# Patient Record
Sex: MALE | Race: BLACK OR AFRICAN AMERICAN | NOT HISPANIC OR LATINO | Employment: OTHER | URBAN - METROPOLITAN AREA
[De-identification: names, ages, dates, MRNs, and addresses within clinical notes are randomized per-mention and may not be internally consistent; named-entity substitution may affect disease eponyms.]

---

## 2023-08-24 DIAGNOSIS — D47.2 MONOCLONAL GAMMOPATHY PRESENT ON SERUM PROTEIN ELECTROPHORESIS: Primary | ICD-10-CM

## 2023-12-12 ENCOUNTER — HOSPITAL ENCOUNTER (OUTPATIENT)
Dept: RADIOLOGY | Facility: HOSPITAL | Age: 63
Discharge: HOME | End: 2023-12-12
Attending: INTERNAL MEDICINE
Payer: MEDICAID

## 2023-12-12 VITALS
OXYGEN SATURATION: 97 % | RESPIRATION RATE: 11 BRPM | WEIGHT: 181.69 LBS | BODY MASS INDEX: 27.54 KG/M2 | SYSTOLIC BLOOD PRESSURE: 138 MMHG | HEART RATE: 94 BPM | TEMPERATURE: 98 F | HEIGHT: 68 IN | DIASTOLIC BLOOD PRESSURE: 75 MMHG

## 2023-12-12 DIAGNOSIS — R80.9 NEPHROTIC RANGE PROTEINURIA: ICD-10-CM

## 2023-12-12 LAB
ALBUMIN SERPL-MCNC: 2.7 G/DL (ref 3.4–4.8)
ALBUMIN/GLOB SERPL: 0.7 RATIO (ref 1.1–2)
ALP SERPL-CCNC: 101 UNIT/L (ref 40–150)
ALT SERPL-CCNC: 12 UNIT/L (ref 0–55)
AST SERPL-CCNC: 10 UNIT/L (ref 5–34)
BASOPHILS # BLD AUTO: 0.11 X10(3)/MCL
BASOPHILS NFR BLD AUTO: 0.9 %
BILIRUB SERPL-MCNC: 0.2 MG/DL
BUN SERPL-MCNC: 35.9 MG/DL (ref 8.4–25.7)
CALCIUM SERPL-MCNC: 9 MG/DL (ref 8.8–10)
CHLORIDE SERPL-SCNC: 109 MMOL/L (ref 98–107)
CO2 SERPL-SCNC: 20 MMOL/L (ref 23–31)
CREAT SERPL-MCNC: 3.65 MG/DL (ref 0.73–1.18)
EOSINOPHIL # BLD AUTO: 0.15 X10(3)/MCL (ref 0–0.9)
EOSINOPHIL NFR BLD AUTO: 1.2 %
ERYTHROCYTE [DISTWIDTH] IN BLOOD BY AUTOMATED COUNT: 13.9 % (ref 11.5–17)
GFR SERPLBLD CREATININE-BSD FMLA CKD-EPI: 18 MLS/MIN/1.73/M2
GLOBULIN SER-MCNC: 3.9 GM/DL (ref 2.4–3.5)
GLUCOSE SERPL-MCNC: 177 MG/DL (ref 82–115)
HCT VFR BLD AUTO: 29.9 % (ref 42–52)
HGB BLD-MCNC: 9.2 G/DL (ref 14–18)
IMM GRANULOCYTES # BLD AUTO: 0.06 X10(3)/MCL (ref 0–0.04)
IMM GRANULOCYTES NFR BLD AUTO: 0.5 %
INR PPP: 0.9
LYMPHOCYTES # BLD AUTO: 1.75 X10(3)/MCL (ref 0.6–4.6)
LYMPHOCYTES NFR BLD AUTO: 13.7 %
MCH RBC QN AUTO: 28.1 PG (ref 27–31)
MCHC RBC AUTO-ENTMCNC: 30.8 G/DL (ref 33–36)
MCV RBC AUTO: 91.4 FL (ref 80–94)
MONOCYTES # BLD AUTO: 0.79 X10(3)/MCL (ref 0.1–1.3)
MONOCYTES NFR BLD AUTO: 6.2 %
NEUTROPHILS # BLD AUTO: 9.96 X10(3)/MCL (ref 2.1–9.2)
NEUTROPHILS NFR BLD AUTO: 77.5 %
NRBC BLD AUTO-RTO: 0 %
PLATELET # BLD AUTO: 400 X10(3)/MCL (ref 130–400)
PMV BLD AUTO: 10.5 FL (ref 7.4–10.4)
POCT GLUCOSE: 173 MG/DL (ref 70–110)
POTASSIUM SERPL-SCNC: 5.5 MMOL/L (ref 3.5–5.1)
PROT SERPL-MCNC: 6.6 GM/DL (ref 5.8–7.6)
PROTHROMBIN TIME: 12.2 SECONDS (ref 12.5–14.5)
RBC # BLD AUTO: 3.27 X10(6)/MCL (ref 4.7–6.1)
SODIUM SERPL-SCNC: 137 MMOL/L (ref 136–145)
WBC # SPEC AUTO: 12.82 X10(3)/MCL (ref 4.5–11.5)

## 2023-12-12 PROCEDURE — 25000003 PHARM REV CODE 250: Performed by: RADIOLOGY

## 2023-12-12 PROCEDURE — 85025 COMPLETE CBC W/AUTO DIFF WBC: CPT | Performed by: RADIOLOGY

## 2023-12-12 PROCEDURE — 85610 PROTHROMBIN TIME: CPT | Performed by: RADIOLOGY

## 2023-12-12 PROCEDURE — 99152 MOD SED SAME PHYS/QHP 5/>YRS: CPT

## 2023-12-12 PROCEDURE — 63600175 PHARM REV CODE 636 W HCPCS: Performed by: RADIOLOGY

## 2023-12-12 PROCEDURE — 77012 CT SCAN FOR NEEDLE BIOPSY: CPT | Mod: TC

## 2023-12-12 PROCEDURE — 80053 COMPREHEN METABOLIC PANEL: CPT | Performed by: RADIOLOGY

## 2023-12-12 RX ORDER — DAPAGLIFLOZIN 10 MG/1
10 TABLET, FILM COATED ORAL DAILY
COMMUNITY

## 2023-12-12 RX ORDER — LEVOTHYROXINE SODIUM 25 UG/1
25 TABLET ORAL
COMMUNITY

## 2023-12-12 RX ORDER — MIDAZOLAM HYDROCHLORIDE 1 MG/ML
INJECTION INTRAMUSCULAR; INTRAVENOUS
Status: DISPENSED
Start: 2023-12-12 | End: 2023-12-12

## 2023-12-12 RX ORDER — FENTANYL CITRATE 50 UG/ML
INJECTION, SOLUTION INTRAMUSCULAR; INTRAVENOUS
Status: COMPLETED | OUTPATIENT
Start: 2023-12-12 | End: 2023-12-12

## 2023-12-12 RX ORDER — FENTANYL CITRATE 50 UG/ML
INJECTION, SOLUTION INTRAMUSCULAR; INTRAVENOUS
Status: DISPENSED
Start: 2023-12-12 | End: 2023-12-12

## 2023-12-12 RX ORDER — GLIMEPIRIDE 1 MG/1
1 TABLET ORAL
COMMUNITY

## 2023-12-12 RX ORDER — ERGOCALCIFEROL 1.25 MG/1
50000 CAPSULE ORAL
COMMUNITY

## 2023-12-12 RX ORDER — FUROSEMIDE 20 MG/1
20 TABLET ORAL DAILY
COMMUNITY

## 2023-12-12 RX ORDER — MIDAZOLAM HYDROCHLORIDE 1 MG/ML
INJECTION INTRAMUSCULAR; INTRAVENOUS
Status: COMPLETED | OUTPATIENT
Start: 2023-12-12 | End: 2023-12-12

## 2023-12-12 RX ORDER — AMLODIPINE BESYLATE 10 MG/1
10 TABLET ORAL DAILY
COMMUNITY

## 2023-12-12 RX ORDER — LIDOCAINE HYDROCHLORIDE 20 MG/ML
INJECTION, SOLUTION INFILTRATION; PERINEURAL
Status: COMPLETED | OUTPATIENT
Start: 2023-12-12 | End: 2023-12-12

## 2023-12-12 RX ORDER — DULAGLUTIDE 3 MG/.5ML
3 INJECTION, SOLUTION SUBCUTANEOUS
COMMUNITY

## 2023-12-12 RX ORDER — LIDOCAINE HYDROCHLORIDE 10 MG/ML
INJECTION INFILTRATION; PERINEURAL
Status: DISPENSED
Start: 2023-12-12 | End: 2023-12-12

## 2023-12-12 RX ORDER — OMEPRAZOLE 40 MG/1
40 CAPSULE, DELAYED RELEASE ORAL DAILY
COMMUNITY

## 2023-12-12 RX ADMIN — MIDAZOLAM 1 MG: 1 INJECTION INTRAMUSCULAR; INTRAVENOUS at 10:12

## 2023-12-12 RX ADMIN — FENTANYL CITRATE 25 MCG: 50 INJECTION, SOLUTION INTRAMUSCULAR; INTRAVENOUS at 10:12

## 2023-12-12 RX ADMIN — LIDOCAINE HYDROCHLORIDE 5 ML: 20 INJECTION, SOLUTION INFILTRATION; PERINEURAL at 10:12

## 2023-12-12 NOTE — H&P
Radiology History & Physical      SUBJECTIVE:     Chief Complaint: Renal Dysfunction    History of Present Illness:  Qasim Harry is a 63 y.o. male who presents for Biopsy  Past Medical History:   Diagnosis Date    Anemia, unspecified     CKD (chronic kidney disease)     Diabetes mellitus     Hypertension     Isolated non-nephrotic proteinuria     Systemic lupus erythematosus, organ or system involvement unspecified      History reviewed. No pertinent surgical history.    Home Meds:   Prior to Admission medications    Medication Sig Start Date End Date Taking? Authorizing Provider   amLODIPine (NORVASC) 10 MG tablet Take 10 mg by mouth once daily.   Yes Provider, Historical   dapagliflozin propanediol (FARXIGA) 10 mg tablet Take 10 mg by mouth once daily.   Yes Provider, Historical   dulaglutide (TRULICITY) 3 mg/0.5 mL pen injector Inject 3 mg into the skin every 7 days.   Yes Provider, Historical   ergocalciferol (VITAMIN D2) 50,000 unit Cap Take 50,000 Units by mouth every 7 days.   Yes Provider, Historical   furosemide (LASIX) 20 MG tablet Take 20 mg by mouth once daily.   Yes Provider, Historical   levothyroxine (SYNTHROID) 25 MCG tablet Take 25 mcg by mouth before breakfast.   Yes Provider, Historical   omeprazole (PRILOSEC) 40 MG capsule Take 40 mg by mouth once daily.   Yes Provider, Historical   glimepiride (AMARYL) 1 MG tablet Take 1 mg by mouth before breakfast.    Provider, Historical   SITagliptin phosphate (JANUVIA) 100 MG Tab Take by mouth once daily.    Provider, Historical     Anticoagulants/Antiplatelets: no anticoagulation    Allergies: Review of patient's allergies indicates:  No Known Allergies  Sedation History:  no adverse reactions    Review of Systems:   Hematological: no known coagulopathies  Respiratory: no shortness of breath  Cardiovascular: no chest pain  Gastrointestinal: no abdominal pain  Genito-Urinary: no dysuria  Musculoskeletal: negative  Neurological: no TIA or stroke symptoms          OBJECTIVE:     Vital Signs (Most Recent)  Temp: 98.1 °F (36.7 °C) (12/12/23 0839)  Pulse: 97 (12/12/23 1025)  Resp: 11 (12/12/23 1025)  BP: 137/80 (12/12/23 1025)  SpO2: 99 % (12/12/23 1025)    Physical Exam:  ASA: 2    General: no acute distress  Mental Status: alert and oriented to person, place and time  HEENT: normocephalic, atraumatic  Chest: unlabored breathing  Heart: regular heart rate  Abdomen: nondistended  Extremity: moves all extremities    Laboratory  Lab Results   Component Value Date    INR 0.9 12/12/2023       Lab Results   Component Value Date    WBC 12.82 (H) 12/12/2023    HGB 9.2 (L) 12/12/2023    HCT 29.9 (L) 12/12/2023    MCV 91.4 12/12/2023     12/12/2023      Lab Results   Component Value Date     12/12/2023    K 5.5 (H) 12/12/2023    CO2 20 (L) 12/12/2023    BUN 35.9 (H) 12/12/2023    CREATININE 3.65 (H) 12/12/2023    CALCIUM 9.0 12/12/2023    ALT 12 12/12/2023    AST 10 12/12/2023    ALBUMIN 2.7 (L) 12/12/2023    BILITOT 0.2 12/12/2023       ASSESSMENT/PLAN:     Sedation Plan: Moderate  Patient will undergo Renal Biopsy.    Ronen Lombardi MD

## 2023-12-15 ENCOUNTER — OFFICE VISIT (OUTPATIENT)
Dept: HEMATOLOGY/ONCOLOGY | Facility: CLINIC | Age: 63
End: 2023-12-15
Attending: INTERNAL MEDICINE
Payer: MEDICAID

## 2023-12-15 VITALS
BODY MASS INDEX: 27.74 KG/M2 | TEMPERATURE: 98 F | SYSTOLIC BLOOD PRESSURE: 144 MMHG | OXYGEN SATURATION: 100 % | WEIGHT: 183 LBS | HEART RATE: 98 BPM | HEIGHT: 68 IN | DIASTOLIC BLOOD PRESSURE: 82 MMHG | RESPIRATION RATE: 18 BRPM

## 2023-12-15 DIAGNOSIS — M32.9 SLE (SYSTEMIC LUPUS ERYTHEMATOSUS RELATED SYNDROME): ICD-10-CM

## 2023-12-15 DIAGNOSIS — D47.2 MONOCLONAL GAMMOPATHY PRESENT ON SERUM PROTEIN ELECTROPHORESIS: ICD-10-CM

## 2023-12-15 DIAGNOSIS — R80.9 NEPHROTIC RANGE PROTEINURIA: ICD-10-CM

## 2023-12-15 DIAGNOSIS — D47.2 MONOCLONAL GAMMOPATHY: Primary | ICD-10-CM

## 2023-12-15 DIAGNOSIS — N18.9 ANEMIA IN CHRONIC KIDNEY DISEASE, UNSPECIFIED CKD STAGE: ICD-10-CM

## 2023-12-15 DIAGNOSIS — D63.1 ANEMIA IN CHRONIC KIDNEY DISEASE, UNSPECIFIED CKD STAGE: ICD-10-CM

## 2023-12-15 DIAGNOSIS — E13.21 DIABETIC NEPHROPATHY ASSOCIATED WITH OTHER SPECIFIED DIABETES MELLITUS: ICD-10-CM

## 2023-12-15 PROBLEM — E11.21 DIABETIC NEPHROPATHY: Status: ACTIVE | Noted: 2023-12-15

## 2023-12-15 LAB
ALBUMIN SERPL-MCNC: 2.6 G/DL (ref 3.4–4.8)
ALBUMIN/GLOB SERPL: 0.6 RATIO (ref 1.1–2)
ALP SERPL-CCNC: 101 UNIT/L (ref 40–150)
ALT SERPL-CCNC: 10 UNIT/L (ref 0–55)
AST SERPL-CCNC: 9 UNIT/L (ref 5–34)
BASOPHILS # BLD AUTO: 0.07 X10(3)/MCL
BASOPHILS NFR BLD AUTO: 0.7 %
BILIRUB SERPL-MCNC: 0.2 MG/DL
BUN SERPL-MCNC: 38.3 MG/DL (ref 8.4–25.7)
CALCIUM SERPL-MCNC: 9.3 MG/DL (ref 8.8–10)
CHLORIDE SERPL-SCNC: 114 MMOL/L (ref 98–107)
CO2 SERPL-SCNC: 17 MMOL/L (ref 23–31)
CREAT SERPL-MCNC: 3 MG/DL (ref 0.73–1.18)
EOSINOPHIL # BLD AUTO: 0.1 X10(3)/MCL (ref 0–0.9)
EOSINOPHIL NFR BLD AUTO: 1 %
ERYTHROCYTE [DISTWIDTH] IN BLOOD BY AUTOMATED COUNT: 13.6 % (ref 11.5–17)
FERRITIN SERPL-MCNC: 72.11 NG/ML (ref 21.81–274.66)
FOLATE SERPL-MCNC: 6.4 NG/ML (ref 7–31.4)
GFR SERPLBLD CREATININE-BSD FMLA CKD-EPI: 23 MLS/MIN/1.73/M2
GLOBULIN SER-MCNC: 4.6 GM/DL (ref 2.4–3.5)
GLUCOSE SERPL-MCNC: 223 MG/DL (ref 82–115)
HAPTOGLOB SERPL-MCNC: 429 MG/DL (ref 40–368)
HCT VFR BLD AUTO: 31.7 % (ref 42–52)
HGB BLD-MCNC: 9.6 G/DL (ref 14–18)
IGA SERPL-MCNC: 429 MG/DL (ref 101–645)
IGG SERPL-MCNC: 913 MG/DL (ref 540–1822)
IGM SERPL-MCNC: 112 MG/DL (ref 22–240)
IMM GRANULOCYTES # BLD AUTO: 0.05 X10(3)/MCL (ref 0–0.04)
IMM GRANULOCYTES NFR BLD AUTO: 0.5 %
IRON SATN MFR SERPL: 34 % (ref 20–50)
IRON SERPL-MCNC: 86 UG/DL (ref 65–175)
LDH SERPL-CCNC: 203 U/L (ref 125–220)
LYMPHOCYTES # BLD AUTO: 1.5 X10(3)/MCL (ref 0.6–4.6)
LYMPHOCYTES NFR BLD AUTO: 14.5 %
MCH RBC QN AUTO: 28.1 PG (ref 27–31)
MCHC RBC AUTO-ENTMCNC: 30.3 G/DL (ref 33–36)
MCV RBC AUTO: 92.7 FL (ref 80–94)
MONOCYTES # BLD AUTO: 0.54 X10(3)/MCL (ref 0.1–1.3)
MONOCYTES NFR BLD AUTO: 5.2 %
NEUTROPHILS # BLD AUTO: 8.05 X10(3)/MCL (ref 2.1–9.2)
NEUTROPHILS NFR BLD AUTO: 78.1 %
NRBC BLD AUTO-RTO: 0 %
PLATELET # BLD AUTO: 371 X10(3)/MCL (ref 130–400)
PMV BLD AUTO: 10.3 FL (ref 7.4–10.4)
POTASSIUM SERPL-SCNC: 5.5 MMOL/L (ref 3.5–5.1)
PROT SERPL-MCNC: 7.2 GM/DL (ref 5.8–7.6)
RBC # BLD AUTO: 3.42 X10(6)/MCL (ref 4.7–6.1)
RET# (OHS): 0.05 X10E6/UL (ref 0.03–0.1)
RETICULOCYTE COUNT AUTOMATED (OLG): 1.56 % (ref 1.1–2.1)
SODIUM SERPL-SCNC: 139 MMOL/L (ref 136–145)
TIBC SERPL-MCNC: 164 UG/DL (ref 69–240)
TIBC SERPL-MCNC: 250 UG/DL (ref 250–450)
TRANSFERRIN SERPL-MCNC: 197 MG/DL (ref 163–344)
VIT B12 SERPL-MCNC: 372 PG/ML (ref 213–816)
WBC # SPEC AUTO: 10.31 X10(3)/MCL (ref 4.5–11.5)

## 2023-12-15 PROCEDURE — 1160F RVW MEDS BY RX/DR IN RCRD: CPT | Mod: CPTII,,, | Performed by: INTERNAL MEDICINE

## 2023-12-15 PROCEDURE — 3077F SYST BP >= 140 MM HG: CPT | Mod: CPTII,,, | Performed by: INTERNAL MEDICINE

## 2023-12-15 PROCEDURE — 85025 COMPLETE CBC W/AUTO DIFF WBC: CPT | Performed by: INTERNAL MEDICINE

## 2023-12-15 PROCEDURE — 82746 ASSAY OF FOLIC ACID SERUM: CPT | Performed by: INTERNAL MEDICINE

## 2023-12-15 PROCEDURE — 83521 IG LIGHT CHAINS FREE EACH: CPT | Performed by: INTERNAL MEDICINE

## 2023-12-15 PROCEDURE — 3077F PR MOST RECENT SYSTOLIC BLOOD PRESSURE >= 140 MM HG: ICD-10-PCS | Mod: CPTII,,, | Performed by: INTERNAL MEDICINE

## 2023-12-15 PROCEDURE — 83540 ASSAY OF IRON: CPT | Performed by: INTERNAL MEDICINE

## 2023-12-15 PROCEDURE — 1159F PR MEDICATION LIST DOCUMENTED IN MEDICAL RECORD: ICD-10-PCS | Mod: CPTII,,, | Performed by: INTERNAL MEDICINE

## 2023-12-15 PROCEDURE — 99204 PR OFFICE/OUTPT VISIT, NEW, LEVL IV, 45-59 MIN: ICD-10-PCS | Mod: S$PBB,,, | Performed by: INTERNAL MEDICINE

## 2023-12-15 PROCEDURE — 3079F PR MOST RECENT DIASTOLIC BLOOD PRESSURE 80-89 MM HG: ICD-10-PCS | Mod: CPTII,,, | Performed by: INTERNAL MEDICINE

## 2023-12-15 PROCEDURE — 83010 ASSAY OF HAPTOGLOBIN QUANT: CPT | Performed by: INTERNAL MEDICINE

## 2023-12-15 PROCEDURE — 99214 OFFICE O/P EST MOD 30 MIN: CPT | Mod: PBBFAC | Performed by: INTERNAL MEDICINE

## 2023-12-15 PROCEDURE — 80053 COMPREHEN METABOLIC PANEL: CPT | Performed by: INTERNAL MEDICINE

## 2023-12-15 PROCEDURE — 1159F MED LIST DOCD IN RCRD: CPT | Mod: CPTII,,, | Performed by: INTERNAL MEDICINE

## 2023-12-15 PROCEDURE — 85045 AUTOMATED RETICULOCYTE COUNT: CPT | Performed by: INTERNAL MEDICINE

## 2023-12-15 PROCEDURE — 83615 LACTATE (LD) (LDH) ENZYME: CPT | Performed by: INTERNAL MEDICINE

## 2023-12-15 PROCEDURE — 86334 IMMUNOFIX E-PHORESIS SERUM: CPT | Performed by: INTERNAL MEDICINE

## 2023-12-15 PROCEDURE — 36415 COLL VENOUS BLD VENIPUNCTURE: CPT | Performed by: INTERNAL MEDICINE

## 2023-12-15 PROCEDURE — 1160F PR REVIEW ALL MEDS BY PRESCRIBER/CLIN PHARMACIST DOCUMENTED: ICD-10-PCS | Mod: CPTII,,, | Performed by: INTERNAL MEDICINE

## 2023-12-15 PROCEDURE — 3008F PR BODY MASS INDEX (BMI) DOCUMENTED: ICD-10-PCS | Mod: CPTII,,, | Performed by: INTERNAL MEDICINE

## 2023-12-15 PROCEDURE — 3008F BODY MASS INDEX DOCD: CPT | Mod: CPTII,,, | Performed by: INTERNAL MEDICINE

## 2023-12-15 PROCEDURE — 3079F DIAST BP 80-89 MM HG: CPT | Mod: CPTII,,, | Performed by: INTERNAL MEDICINE

## 2023-12-15 PROCEDURE — 82728 ASSAY OF FERRITIN: CPT | Performed by: INTERNAL MEDICINE

## 2023-12-15 PROCEDURE — 84165 PROTEIN E-PHORESIS SERUM: CPT | Performed by: INTERNAL MEDICINE

## 2023-12-15 PROCEDURE — 99204 OFFICE O/P NEW MOD 45 MIN: CPT | Mod: S$PBB,,, | Performed by: INTERNAL MEDICINE

## 2023-12-15 PROCEDURE — 82607 VITAMIN B-12: CPT | Performed by: INTERNAL MEDICINE

## 2023-12-15 PROCEDURE — 82784 ASSAY IGA/IGD/IGG/IGM EACH: CPT | Performed by: INTERNAL MEDICINE

## 2023-12-15 NOTE — PROGRESS NOTES
History:  Past Medical History:   Diagnosis Date    Anemia, unspecified     CKD (chronic kidney disease)     Diabetes mellitus     Hypertension     Isolated non-nephrotic proteinuria     Systemic lupus erythematosus, organ or system involvement unspecified    Past medical history:  As above.  CKD, stage IIIB.  Diabetic nephropathy.  Hypertension.  Nephrotic range proteinuria.  Anemia of CKD.  SLE (on 12/15/2023, he tells me that he has had lupus for last 10 years; has been off treatment for last 7-8 years; currently, appears to be inactive).  Microscopic hematuria.    Social history:  Lives in [place], [place].  Single.  Has 2 children.  Does not work.  Smoked 1 to 1-1/2 packs of cigarettes daily for 40 years; quit 10 years ago when he was diagnosed with SLE.  Social alcohol.  No illicit drugs.    Family history:  Negative for cancers or blood dyscrasias.      Health maintenance:  PCP in [place], [place].  Says that he has had 3 colonoscopies done in [place], [place]; last colonoscopy was 5-6 years ago which showed a few colon polyps.   No past surgical history on file.   Social History     Socioeconomic History    Marital status: Single   Tobacco Use    Smoking status: Former     Current packs/day: 0.00     Types: Cigarettes     Quit date: 12/12/2008     Years since quitting: 15.0    Smokeless tobacco: Never   Substance and Sexual Activity    Alcohol use: Yes     Alcohol/week: 1.0 - 2.0 standard drink of alcohol     Types: 1 - 2 Cans of beer per week    Drug use: Never      No family history on file.     Reason for Follow-up:  Monoclonal gammopathy   Anemia of CKD  Diabetic nephropathy, CKD stage IIIA/IIIb/4, nephrotic range protein urea, SLE    History of Present Illness:   No chief complaint on file.        Oncologic/Hematologic History:  Oncology History    No history exists.   63-year-old gentleman, referred by KATHIE Tyler, for evaluation of monoclonal gammopathy.      07/11/2023: Nephrology  office note:  CKD, stage 3b (GFR 30); NIDDM with diabetic CKD; hypertension; nephrotic range proteinuria; anemia of CKD; SLE   Nephrotic range proteinuria,18.2 g daily  History of allergy to ACE inhibitor  Kidney biopsy had to be canceled due to uncontrolled blood pressure  SPEP showed monoclonal protein  Anemia stable      Labs reviewed:  -02/27/2023:  SPEP:  Possible monoclonal protein (M protein) present; recommend serum immunofixation  -05/02/2023:  BUN 23, normal; creatinine 2.40, elevated; estimated GFR 29.4 mL/minute; hemoglobin 9.9; MCV 94.7, normal  -07/05/2023:  Albumin 3.2, low; creatinine 2.60, elevated; total protein 6.0, low; BUN 28.6, elevated; magnesium, uric acid normal; 25 hydroxy vitamin-D level 12 ng/mL, low  -07/05/2023:  CBC:  Hemoglobin 9.5; MCV normal  -12/12/2023:  Kidney biopsy, right kidney by IR  -12/12/2023:  Hemoglobin 9.2, MCV normal; BUN 35.9; creatinine 3.65; albumin 2.7    12/15/2023:   Presents for initial medical oncology/hematology consultation, accompanied by a male , probably his son.  In no acute discomfort.  Overall, feels well and healthy.  ECOG 0.  Some nausea and diarrhea without any blood.  Good appetite.  Good energy.  No unusual headaches, focal neurological symptoms, chest pain, cough, dyspnea, recurrent fevers or chills, drenching night sweats, anorexia, unintentional weight loss, abdominal pain, nausea, vomiting, bone pains, etc..  He has underlying CKD for which she underwent kidney biopsy recently.  Says that he was diagnosed with SLE 10 years ago; has been off treatment for last 7-8 years, probably, currently, in remission; he has a few scars in the face as residual.        Interval History:  [No matching plan found]   [No matching plan found]       Medications:  Current Outpatient Medications on File Prior to Visit   Medication Sig Dispense Refill    amLODIPine (NORVASC) 10 MG tablet Take 10 mg by mouth once daily.      dapagliflozin propanediol  (FARXIGA) 10 mg tablet Take 10 mg by mouth once daily.      dulaglutide (TRULICITY) 3 mg/0.5 mL pen injector Inject 3 mg into the skin every 7 days.      ergocalciferol (VITAMIN D2) 50,000 unit Cap Take 50,000 Units by mouth every 7 days.      furosemide (LASIX) 20 MG tablet Take 20 mg by mouth once daily.      glimepiride (AMARYL) 1 MG tablet Take 1 mg by mouth before breakfast.      levothyroxine (SYNTHROID) 25 MCG tablet Take 25 mcg by mouth before breakfast.      omeprazole (PRILOSEC) 40 MG capsule Take 40 mg by mouth once daily.      SITagliptin phosphate (JANUVIA) 100 MG Tab Take by mouth once daily.       No current facility-administered medications on file prior to visit.     Review of Systems:   All systems reviewed and found to be negative except for the symptoms detailed above    Physical Examination:   VITAL SIGNS: There were no vitals filed for this visit.  GENERAL:  In no apparent distress.    HEAD:  No signs of head trauma.  EYES:  Pupils are equal.  Extraocular motions intact.    EARS:  Hearing grossly intact.  MOUTH:  Oropharynx is normal.   NECK:  No adenopathy, no JVD.     CHEST:  Chest with clear breath sounds bilaterally.  No wheezes, rales, rhonchi.    CARDIAC:  Regular rate and rhythm.  S1 and S2, without murmurs, gallops, rubs.  VASCULAR:  No Edema.  Peripheral pulses normal and equal in all extremities.  ABDOMEN:  Soft, without detectable tenderness.  No sign of distention.  No   rebound or guarding, and no masses palpated.   Bowel Sounds normal.  MUSCULOSKELETAL:  Good range of motion of all major joints. Extremities without clubbing, cyanosis or edema.    NEUROLOGIC EXAM:  Alert and oriented x 3.  No focal sensory or strength deficits.   Speech normal.  Follows commands.  PSYCHIATRIC:  Mood normal.    Results for orders placed or performed during the hospital encounter of 12/12/23   CBC Auto Differential    Narrative    The following orders were created for panel order CBC Auto  Differential.  Procedure                               Abnormality         Status                     ---------                               -----------         ------                     CBC with Differential[[id number]]       Abnormal            Final result                 Please view results for these tests on the individual orders.   CBC with Differential   Result Value Ref Range    WBC 12.82 (H) 4.50 - 11.50 x10(3)/mcL    RBC 3.27 (L) 4.70 - 6.10 x10(6)/mcL    Hgb 9.2 (L) 14.0 - 18.0 g/dL    Hct 29.9 (L) 42.0 - 52.0 %    MCV 91.4 80.0 - 94.0 fL    MCH 28.1 27.0 - 31.0 pg    MCHC 30.8 (L) 33.0 - 36.0 g/dL    RDW 13.9 11.5 - 17.0 %    Platelet 400 130 - 400 x10(3)/mcL    MPV 10.5 (H) 7.4 - 10.4 fL    Neut % 77.5 %    Lymph % 13.7 %    Mono % 6.2 %    Eos % 1.2 %    Basophil % 0.9 %    Lymph # 1.75 0.6 - 4.6 x10(3)/mcL    Neut # 9.96 (H) 2.1 - 9.2 x10(3)/mcL    Mono # 0.79 0.1 - 1.3 x10(3)/mcL    Eos # 0.15 0 - 0.9 x10(3)/mcL    Baso # 0.11 <=0.2 x10(3)/mcL    IG# 0.06 (H) 0 - 0.04 x10(3)/mcL    IG% 0.5 %    NRBC% 0.0 %     Results for orders placed or performed during the hospital encounter of 12/12/23   Comprehensive Metabolic Panel   Result Value Ref Range    Sodium Level 137 136 - 145 mmol/L    Potassium Level 5.5 (H) 3.5 - 5.1 mmol/L    Chloride 109 (H) 98 - 107 mmol/L    Carbon Dioxide 20 (L) 23 - 31 mmol/L    Glucose Level 177 (H) 82 - 115 mg/dL    Blood Urea Nitrogen 35.9 (H) 8.4 - 25.7 mg/dL    Creatinine 3.65 (H) 0.73 - 1.18 mg/dL    Calcium Level Total 9.0 8.8 - 10.0 mg/dL    Protein Total 6.6 5.8 - 7.6 gm/dL    Albumin Level 2.7 (L) 3.4 - 4.8 g/dL    Globulin 3.9 (H) 2.4 - 3.5 gm/dL    Albumin/Globulin Ratio 0.7 (L) 1.1 - 2.0 ratio    Bilirubin Total 0.2 <=1.5 mg/dL    Alkaline Phosphatase 101 40 - 150 unit/L    Alanine Aminotransferase 12 0 - 55 unit/L    Aspartate Aminotransferase 10 5 - 34 unit/L    eGFR 18 mls/min/1.73/m2       Assessment:  Problem List Items Addressed This Visit     None    63-year-old gentleman with history of NIDDM, diabetic nephropathy, CKD, stage 3a, 3b, and 4 at various times, worsening, nephrotic range proteinuria, S/P kidney biopsy 12/12/2023) pathology report pending),     Referred for evaluation of questionable monoclonal gammopathy noted on SPEP 02/27/2023    Also, history of anemia of CKD    History of SLE; apparently, diagnosed 10 years ago, approximately around 2013; treated in [place], [place]; has been off treatment for 7-8 years; apparently, in remission at this time; has a few facial scars as stigmata.      Plan:  -await kidney biopsy report   -will order SPEP, JOSEFINA, FLC assay, and 24 hour urine for total protein, UPEP, and urine FLC assay  -check CBC, CMP, retic count, serum iron, TIBC, ferritin, B12 level, folic acid level, LDH, haptoglobin, FIT test       Follow-up in 3 weeks.      Above discussed at length with the patient.  All questions answered.  Rationale of investigations discussed.  Nature of monoclonal gammopathy discussed.  He understands and agrees with this plan.    Follow-up:  No follow-ups on file.

## 2023-12-18 ENCOUNTER — TELEPHONE (OUTPATIENT)
Dept: HEMATOLOGY/ONCOLOGY | Facility: CLINIC | Age: 63
End: 2023-12-18
Payer: MEDICAID

## 2023-12-18 DIAGNOSIS — E53.8 LOW FOLATE: Primary | ICD-10-CM

## 2023-12-18 LAB
ALBUMIN % SPEP (OHS): 34.1
ALBUMIN SERPL-MCNC: 2.2 G/DL (ref 3.4–4.8)
ALBUMIN/GLOB SERPL: 0.5 RATIO (ref 1.1–2)
ALPHA 1 GLOB (OHS): 0.27 GM/DL
ALPHA 1 GLOB% (OHS): 4.21
ALPHA 2 GLOB % (OHS): 24.06
ALPHA 2 GLOB (OHS): 1.56 GM/DL
BETA GLOB (OHS): 1.57 GM/DL
BETA GLOB% (OHS): 24.2
GAMMA GLOBULIN % (OHS): 13.44
GAMMA GLOBULIN (OHS): 0.87 GM/DL
GLOBULIN SER-MCNC: 4.3 GM/DL (ref 2.4–3.5)
KAPPA LC FREE SER-MCNC: 8.89 MG/DL (ref 0.33–1.94)
KAPPA LC FREE/LAMBDA FREE SER: 1.37 {RATIO} (ref 0.26–1.65)
LAMBDA LC FREE SERPL-MCNC: 6.51 MG/DL (ref 0.57–2.63)
M SPIKE % (OHS): ABNORMAL
M SPIKE (OHS): ABNORMAL
PATH REV: NORMAL
PROT SERPL-MCNC: 6.5 GM/DL (ref 5.8–7.6)
PSYCHE PATHOLOGY RESULT: NORMAL

## 2023-12-20 ENCOUNTER — CLINICAL SUPPORT (OUTPATIENT)
Dept: HEMATOLOGY/ONCOLOGY | Facility: CLINIC | Age: 63
End: 2023-12-20
Payer: MEDICAID

## 2023-12-20 DIAGNOSIS — N18.9 ANEMIA IN CHRONIC KIDNEY DISEASE, UNSPECIFIED CKD STAGE: ICD-10-CM

## 2023-12-20 DIAGNOSIS — D47.2 MONOCLONAL GAMMOPATHY: ICD-10-CM

## 2023-12-20 DIAGNOSIS — D47.2 MONOCLONAL GAMMOPATHY PRESENT ON SERUM PROTEIN ELECTROPHORESIS: ICD-10-CM

## 2023-12-20 DIAGNOSIS — D63.1 ANEMIA IN CHRONIC KIDNEY DISEASE, UNSPECIFIED CKD STAGE: ICD-10-CM

## 2023-12-20 PROCEDURE — 84166 PROTEIN E-PHORESIS/URINE/CSF: CPT

## 2023-12-20 RX ORDER — FOLIC ACID 1 MG/1
1 TABLET ORAL DAILY
Qty: 30 TABLET | Refills: 0 | Status: SHIPPED | OUTPATIENT
Start: 2023-12-20

## 2023-12-22 LAB
ALBUMIN 24H UR ELPH-MRATE: 7835.1 MG/24 H
ALBUMIN/GLOB 24H UR ELPH: 1.81 {RATIO}
ALPHA1 GLOB 24H UR ELPH-MRATE: 723.2 MG/24 H
ALPHA2 GLOB 24H UR ELPH-MRATE: 964.3 MG/24 H
B-GLOBULIN 24H UR ELPH-MRATE: 1567 MG/24 H
COLLECT DURATION TIME UR: 24 H
GAMMA GLOB 24H UR ELPH-MRATE: 1084.9 MG/24 H
PROT 24H UR-MRATE: ABNORMAL MG/24 H
PROT PATTERN 24H UR ELPH-IMP: ABNORMAL
SPECIMEN VOL 24H UR: 2100 ML

## 2024-01-20 PROBLEM — E53.8 FOLIC ACID DEFICIENCY: Status: ACTIVE | Noted: 2024-01-20

## 2024-01-20 NOTE — PROGRESS NOTES
History:  Past Medical History:   Diagnosis Date    Anemia, unspecified     CKD (chronic kidney disease)     Diabetes mellitus     Hypertension     Isolated non-nephrotic proteinuria     Systemic lupus erythematosus, organ or system involvement unspecified    Past medical history:  As above.  CKD, stage IIIB.  Diabetic nephropathy.  Hypertension.  Nephrotic range proteinuria.  Anemia of CKD.  SLE (on 12/15/2023, he tells me that he has had lupus for last 10 years; has been off treatment for last 7-8 years; currently, appears to be inactive).  Microscopic hematuria.    Social history:  Lives in [place], [place].  Single.  Has 2 children.  Does not work.  Smoked 1 to 1-1/2 packs of cigarettes daily for 40 years; quit 10 years ago when he was diagnosed with SLE.  Social alcohol.  No illicit drugs.    Family history:  Negative for cancers or blood dyscrasias.      Health maintenance:  PCP in [place], [place].  Says that he has had 3 colonoscopies done in [place], [place]; last colonoscopy was 5-6 years ago which showed a few colon polyps.   History reviewed. No pertinent surgical history.   Social History     Socioeconomic History    Marital status: Single   Tobacco Use    Smoking status: Former     Current packs/day: 0.00     Types: Cigarettes     Quit date: 12/12/2008     Years since quitting: 15.1    Smokeless tobacco: Never   Substance and Sexual Activity    Alcohol use: Yes     Alcohol/week: 1.0 - 2.0 standard drink of alcohol     Types: 1 - 2 Cans of beer per week    Drug use: Never      History reviewed. No pertinent family history.     Reason for Follow-up:  Monoclonal gammopathy   Anemia of CKD  Diabetic nephropathy, CKD stage IIIA/IIIb/4, nephrotic range protein urea, SLE    History of Present Illness:   Monoclonal gammopathy        Oncologic/Hematologic History:  Oncology History    No history exists.   63-year-old gentleman, referred by KATHIE Tyler, for evaluation of monoclonal  gammopathy.      07/11/2023: Nephrology office note:  CKD, stage 3b (GFR 30); NIDDM with diabetic CKD; hypertension; nephrotic range proteinuria; anemia of CKD; SLE   Nephrotic range proteinuria,18.2 g daily  History of allergy to ACE inhibitor  Kidney biopsy had to be canceled due to uncontrolled blood pressure  SPEP showed monoclonal protein  Anemia stable      Labs reviewed:  -02/27/2023:  SPEP:  Possible monoclonal protein (M protein) present; recommend serum immunofixation  -05/02/2023:  BUN 23, normal; creatinine 2.40, elevated; estimated GFR 29.4 mL/minute; hemoglobin 9.9; MCV 94.7, normal  -07/05/2023:  Albumin 3.2, low; creatinine 2.60, elevated; total protein 6.0, low; BUN 28.6, elevated; magnesium, uric acid normal; 25 hydroxy vitamin-D level 12 ng/mL, low  -07/05/2023:  CBC:  Hemoglobin 9.5; MCV normal  -12/12/2023:  Kidney biopsy, right kidney by IR  -12/12/2023:  Hemoglobin 9.2, MCV normal; BUN 35.9; creatinine 3.65; albumin 2.7    12/15/2023:   Presents for initial medical oncology/hematology consultation, accompanied by a male , probably his son.  In no acute discomfort.  Overall, feels well and healthy.  ECOG 0.  Some nausea and diarrhea without any blood.  Good appetite.  Good energy.  No unusual headaches, focal neurological symptoms, chest pain, cough, dyspnea, recurrent fevers or chills, drenching night sweats, anorexia, unintentional weight loss, abdominal pain, nausea, vomiting, bone pains, etc..  He has underlying CKD for which she underwent kidney biopsy recently.  Says that he was diagnosed with SLE 10 years ago; has been off treatment for last 7-8 years, probably, currently, in remission; he has a few scars in the face as residual.      Interval History:  [No matching plan found]   [No matching plan found]     01/22/2024:   -12/15/2023:  Hemoglobin 9.6, stable; MCV normal; rest of CBC unremarkable; ferritin 72.11; transferrin saturation 34%, normal; B12 372, normal; folate 6.4,  low; haptoglobin 429, elevated; retic count 1.56%; BUN 38.3; creatinine 3.0, stable; albumin 2.2; globulins 4.3-4.6 gm/dL; LDH normal; IgG, IgA, IgM normal; no monoclonal bands on SPEP and JOSEFINA; kappa 8.89, elevated; lambda 6.51, elevated; kappa/lambda ratio 1.37, normal  -12/20/2023:  24 hour urine studies:  Urine volume 2100 cc; 24 hour urine protein 67820 mg (reference Range:  < 229 mg/24 hours); no M spike in urine  -12/12/2023:  Right kidney biopsy:  Limited biopsy with nodular diabetic glomerulosclerosis, class III  Presents for a follow-up visit.  Doing well.  No new complaints.  Great appetite.  We discussed labs and kidney biopsy report.      Medications:  Current Outpatient Medications on File Prior to Visit   Medication Sig Dispense Refill    amLODIPine (NORVASC) 10 MG tablet Take 10 mg by mouth once daily.      dapagliflozin propanediol (FARXIGA) 10 mg tablet Take 10 mg by mouth once daily.      dulaglutide (TRULICITY) 3 mg/0.5 mL pen injector Inject 3 mg into the skin every 7 days.      ergocalciferol (VITAMIN D2) 50,000 unit Cap Take 50,000 Units by mouth every 7 days.      folic acid (FOLVITE) 1 MG tablet Take 1 tablet (1 mg total) by mouth once daily. 30 tablet 0    furosemide (LASIX) 20 MG tablet Take 20 mg by mouth once daily.      glimepiride (AMARYL) 1 MG tablet Take 1 mg by mouth before breakfast.      levothyroxine (SYNTHROID) 25 MCG tablet Take 25 mcg by mouth before breakfast.      omeprazole (PRILOSEC) 40 MG capsule Take 40 mg by mouth once daily.      SITagliptin phosphate (JANUVIA) 100 MG Tab Take by mouth once daily.       No current facility-administered medications on file prior to visit.     Review of Systems:   All systems reviewed and found to be negative except for the symptoms detailed above    Physical Examination:   VITAL SIGNS:   Vitals:    01/22/24 1331   BP: (!) 169/88   Pulse: 101   Resp: 19   Temp: 97.8 °F (36.6 °C)     GENERAL:  In no apparent distress.    HEAD:  No signs of  head trauma.  EYES:  Pupils are equal.  Extraocular motions intact.    EARS:  Hearing grossly intact.  MOUTH:  Oropharynx is normal.   NECK:  No adenopathy, no JVD.     CHEST:  Chest with clear breath sounds bilaterally.  No wheezes, rales, rhonchi.    CARDIAC:  Regular rate and rhythm.  S1 and S2, without murmurs, gallops, rubs.  VASCULAR:  No Edema.  Peripheral pulses normal and equal in all extremities.  ABDOMEN:  Soft, without detectable tenderness.  No sign of distention.  No   rebound or guarding, and no masses palpated.   Bowel Sounds normal.  MUSCULOSKELETAL:  Good range of motion of all major joints. Extremities without clubbing, cyanosis or edema.    NEUROLOGIC EXAM:  Alert and oriented x 3.  No focal sensory or strength deficits.   Speech normal.  Follows commands.  PSYCHIATRIC:  Mood normal.    Results for orders placed or performed during the hospital encounter of 12/12/23   CBC Auto Differential    Narrative    The following orders were created for panel order CBC Auto Differential.  Procedure                               Abnormality         Status                     ---------                               -----------         ------                     CBC with Differential[[id number]]       Abnormal            Final result                 Please view results for these tests on the individual orders.   CBC with Differential   Result Value Ref Range    WBC 12.82 (H) 4.50 - 11.50 x10(3)/mcL    RBC 3.27 (L) 4.70 - 6.10 x10(6)/mcL    Hgb 9.2 (L) 14.0 - 18.0 g/dL    Hct 29.9 (L) 42.0 - 52.0 %    MCV 91.4 80.0 - 94.0 fL    MCH 28.1 27.0 - 31.0 pg    MCHC 30.8 (L) 33.0 - 36.0 g/dL    RDW 13.9 11.5 - 17.0 %    Platelet 400 130 - 400 x10(3)/mcL    MPV 10.5 (H) 7.4 - 10.4 fL    Neut % 77.5 %    Lymph % 13.7 %    Mono % 6.2 %    Eos % 1.2 %    Basophil % 0.9 %    Lymph # 1.75 0.6 - 4.6 x10(3)/mcL    Neut # 9.96 (H) 2.1 - 9.2 x10(3)/mcL    Mono # 0.79 0.1 - 1.3 x10(3)/mcL    Eos # 0.15 0 - 0.9 x10(3)/mcL    Baso  # 0.11 <=0.2 x10(3)/mcL    IG# 0.06 (H) 0 - 0.04 x10(3)/mcL    IG% 0.5 %    NRBC% 0.0 %     Results for orders placed or performed during the hospital encounter of 12/12/23   Comprehensive Metabolic Panel   Result Value Ref Range    Sodium Level 137 136 - 145 mmol/L    Potassium Level 5.5 (H) 3.5 - 5.1 mmol/L    Chloride 109 (H) 98 - 107 mmol/L    Carbon Dioxide 20 (L) 23 - 31 mmol/L    Glucose Level 177 (H) 82 - 115 mg/dL    Blood Urea Nitrogen 35.9 (H) 8.4 - 25.7 mg/dL    Creatinine 3.65 (H) 0.73 - 1.18 mg/dL    Calcium Level Total 9.0 8.8 - 10.0 mg/dL    Protein Total 6.6 5.8 - 7.6 gm/dL    Albumin Level 2.7 (L) 3.4 - 4.8 g/dL    Globulin 3.9 (H) 2.4 - 3.5 gm/dL    Albumin/Globulin Ratio 0.7 (L) 1.1 - 2.0 ratio    Bilirubin Total 0.2 <=1.5 mg/dL    Alkaline Phosphatase 101 40 - 150 unit/L    Alanine Aminotransferase 12 0 - 55 unit/L    Aspartate Aminotransferase 10 5 - 34 unit/L    eGFR 18 mls/min/1.73/m2       Assessment:  Problem List Items Addressed This Visit          Renal/    Diabetic nephropathy    Nephrotic range proteinuria - Primary       Immunology/Multi System    SLE (systemic lupus erythematosus related syndrome)       Oncology    Monoclonal gammopathy    Anemia in chronic kidney disease (CKD)       Endocrine    Folic acid deficiency     63-year-old gentleman with history of NIDDM, diabetic nephropathy, CKD, stage 3a/3b/4 at different times, worsening, nephrotic range proteinuria, S/P kidney biopsy 12/12/2023 (pathology report pending),     Referred for evaluation of questionable monoclonal gammopathy noted on SPEP (02/27/2023):  -12/12/2023: Right kidney biopsy:  Limited biopsy; nodular diabetic glomerulosclerosis, class III  -12/15/2023: Hemoglobin 9.6; ferritin 72.11; transferrin saturation 34%; B12 normal; folate 6.4, low; no hemolysis  -12/15/2023:  SPEP, JOSEFINA:  No monoclonal protein; kappa elevated, lambda elevated, kappa/lambda ratio normal  -12/20/2023:  24 hour urine studies: Nephrotic  range proteinuria (75660 mg/24 hours); no M protein in urine  Conclusion:  Patient does not have monoclonal gammopathy      Also, history of anemia of CKD:  -12/15/2023: Hemoglobin 9.6 (stable); ferritin 72.11; transferrin saturation 34%; B12 normal; folate 6.4, low; no hemolysis  (Chronic anemia due to CKD; iron stores normal; B12 normal; started on folic acid 1 mg daily 12/15/2023; no hemolysis)      History of CKD:  (nodular diabetic glomerulosclerosis on limited kidney biopsy)  -12/12/2023: Right kidney biopsy:  Limited biopsy; nodular diabetic glomerulosclerosis, class III      -history of NIDDM, diabetic nephropathy, nephrotic range proteinuria, history of SLE (treated in the past, apparently in remission at this time)   -12/12/2023: Right kidney biopsy:  Limited biopsy; nodular diabetic glomerulosclerosis, class III      History of SLE; apparently, diagnosed 10 years ago, approximately around 2013; treated in [place], [place]; has been off treatment for 7-8 years; apparently, in remission at this time; has a few facial scars as stigmata.      Plan:  In March, SPEP, JOSEFINA, FLC assay, and 24 hour urine for total protein, UPEP, urine JOSEFINA, and urine FLC assay  Check folic acid level today  Follow-up in March with labs.  ---------------------      -patient does not have monoclonal gammopathy   -elevated kappa and lambda light chains in blood, are secondary to CKD (of note, kappa/lambda ratio is normal)   -just for the sake of follow-up, we will repeat SPEP, JOSEFINA, FLC assay, and 24 hour urine total protein, UPEP, urine JOSEFINA, and urine FLC assay in 3 months (March)    -history of anemia of CKD and folic acid deficiency   -started on folic acid 1 mg p.o. q.day on 11/15/2023  -follow-up with renal for AVE therapy as and when needed  -check folic acid level today    -CKD   -nodular diabetic glomerulosclerosis on limited kidney biopsy  -history of SLE in the past; no immune complex deposition noted on kidney biopsy,  although kidney biopsy was limited  -of course, follow-up with nephrology    In March, SPEP, JOSEFINA, FLC assay, and 24 hour urine for total protein, UPEP, urine JOSEFINA, and urine FLC assay  Check folic acid level today  Follow-up in March with labs.    Above discussed at length with the patient.  All questions answered.  Discussed labs.  Gave him copies of relevant records.  He understands and agrees with this plan.    Follow-up:  No follow-ups on file.

## 2024-01-22 ENCOUNTER — OFFICE VISIT (OUTPATIENT)
Dept: HEMATOLOGY/ONCOLOGY | Facility: CLINIC | Age: 64
End: 2024-01-22
Attending: INTERNAL MEDICINE
Payer: MEDICAID

## 2024-01-22 VITALS
HEART RATE: 101 BPM | SYSTOLIC BLOOD PRESSURE: 169 MMHG | WEIGHT: 189.63 LBS | HEIGHT: 68 IN | TEMPERATURE: 98 F | RESPIRATION RATE: 19 BRPM | DIASTOLIC BLOOD PRESSURE: 88 MMHG | OXYGEN SATURATION: 98 % | BODY MASS INDEX: 28.74 KG/M2

## 2024-01-22 DIAGNOSIS — E53.8 FOLIC ACID DEFICIENCY: ICD-10-CM

## 2024-01-22 DIAGNOSIS — M32.9 SLE (SYSTEMIC LUPUS ERYTHEMATOSUS RELATED SYNDROME): ICD-10-CM

## 2024-01-22 DIAGNOSIS — R80.9 NEPHROTIC RANGE PROTEINURIA: Primary | ICD-10-CM

## 2024-01-22 DIAGNOSIS — E13.21 DIABETIC NEPHROPATHY ASSOCIATED WITH OTHER SPECIFIED DIABETES MELLITUS: ICD-10-CM

## 2024-01-22 DIAGNOSIS — R80.9 NEPHROTIC RANGE PROTEINURIA: ICD-10-CM

## 2024-01-22 DIAGNOSIS — D47.2 MONOCLONAL GAMMOPATHY: ICD-10-CM

## 2024-01-22 DIAGNOSIS — N18.9 ANEMIA IN CHRONIC KIDNEY DISEASE, UNSPECIFIED CKD STAGE: ICD-10-CM

## 2024-01-22 DIAGNOSIS — D47.2 MONOCLONAL GAMMOPATHY: Primary | ICD-10-CM

## 2024-01-22 DIAGNOSIS — D63.1 ANEMIA IN CHRONIC KIDNEY DISEASE, UNSPECIFIED CKD STAGE: ICD-10-CM

## 2024-01-22 LAB — FOLATE SERPL-MCNC: 27.9 NG/ML (ref 7–31.4)

## 2024-01-22 PROCEDURE — 3008F BODY MASS INDEX DOCD: CPT | Mod: CPTII,,, | Performed by: INTERNAL MEDICINE

## 2024-01-22 PROCEDURE — 99214 OFFICE O/P EST MOD 30 MIN: CPT | Mod: PBBFAC | Performed by: INTERNAL MEDICINE

## 2024-01-22 PROCEDURE — 36415 COLL VENOUS BLD VENIPUNCTURE: CPT | Performed by: INTERNAL MEDICINE

## 2024-01-22 PROCEDURE — 82746 ASSAY OF FOLIC ACID SERUM: CPT | Performed by: INTERNAL MEDICINE

## 2024-01-22 PROCEDURE — 3079F DIAST BP 80-89 MM HG: CPT | Mod: CPTII,,, | Performed by: INTERNAL MEDICINE

## 2024-01-22 PROCEDURE — 1160F RVW MEDS BY RX/DR IN RCRD: CPT | Mod: CPTII,,, | Performed by: INTERNAL MEDICINE

## 2024-01-22 PROCEDURE — 1159F MED LIST DOCD IN RCRD: CPT | Mod: CPTII,,, | Performed by: INTERNAL MEDICINE

## 2024-01-22 PROCEDURE — 99213 OFFICE O/P EST LOW 20 MIN: CPT | Mod: S$PBB,,, | Performed by: INTERNAL MEDICINE

## 2024-01-22 PROCEDURE — 3077F SYST BP >= 140 MM HG: CPT | Mod: CPTII,,, | Performed by: INTERNAL MEDICINE

## 2024-01-22 NOTE — Clinical Note
In March, SPEP, JOSEFINA, FLC assay, and 24 hour urine for total protein, UPEP, urine JOSEFINA, and urine FLC assay Check folic acid level today Follow-up in March with labs.

## 2024-03-14 ENCOUNTER — LAB VISIT (OUTPATIENT)
Dept: HEMATOLOGY/ONCOLOGY | Facility: CLINIC | Age: 64
End: 2024-03-14
Payer: MEDICAID

## 2024-03-14 DIAGNOSIS — D47.2 MONOCLONAL GAMMOPATHY: ICD-10-CM

## 2024-03-14 DIAGNOSIS — R80.9 NEPHROTIC RANGE PROTEINURIA: ICD-10-CM

## 2024-03-14 LAB
IGA SERPL-MCNC: 378 MG/DL (ref 101–645)
IGG SERPL-MCNC: 756 MG/DL (ref 540–1822)
IGM SERPL-MCNC: 85 MG/DL (ref 22–240)

## 2024-03-14 PROCEDURE — 36415 COLL VENOUS BLD VENIPUNCTURE: CPT

## 2024-03-14 PROCEDURE — 82784 ASSAY IGA/IGD/IGG/IGM EACH: CPT

## 2024-03-14 PROCEDURE — 84166 PROTEIN E-PHORESIS/URINE/CSF: CPT

## 2024-03-14 PROCEDURE — 83521 IG LIGHT CHAINS FREE EACH: CPT

## 2024-03-14 PROCEDURE — 84165 PROTEIN E-PHORESIS SERUM: CPT

## 2024-03-15 LAB
ALBUMIN % SPEP (OHS): 42.71
ALBUMIN SERPL-MCNC: 2.5 G/DL (ref 3.4–4.8)
ALBUMIN/GLOB SERPL: 0.7 RATIO (ref 1.1–2)
ALPHA 1 GLOB (OHS): 0.18 GM/DL
ALPHA 1 GLOB% (OHS): 3.02
ALPHA 2 GLOB % (OHS): 22.33
ALPHA 2 GLOB (OHS): 1.32 GM/DL
BETA GLOB (OHS): 1.25 GM/DL
BETA GLOB% (OHS): 21.23
GAMMA GLOBULIN % (OHS): 10.7
GAMMA GLOBULIN (OHS): 0.63 GM/DL
GLOBULIN SER-MCNC: 3.4 GM/DL (ref 2.4–3.5)
KAPPA LC FREE SER-MCNC: 9.27 MG/DL (ref 0.33–1.94)
KAPPA LC FREE/LAMBDA FREE SER: 1.4 {RATIO} (ref 0.26–1.65)
LAMBDA LC FREE SERPL-MCNC: 6.62 MG/DL (ref 0.57–2.63)
M SPIKE % (OHS): ABNORMAL
M SPIKE (OHS): ABNORMAL
PATH REV: NORMAL
PROT SERPL-MCNC: 5.9 GM/DL (ref 5.8–7.6)

## 2024-03-18 NOTE — PROGRESS NOTES
History:  Past Medical History:   Diagnosis Date    Anemia, unspecified     CKD (chronic kidney disease)     Diabetes mellitus     Hypertension     Isolated non-nephrotic proteinuria     Systemic lupus erythematosus, organ or system involvement unspecified    Past medical history:  As above.  CKD, stage IIIB.  Diabetic nephropathy.  Hypertension.  Nephrotic range proteinuria.  Anemia of CKD.  SLE (on 12/15/2023, he tells me that he has had lupus for last 10 years; has been off treatment for last 7-8 years; currently, appears to be inactive).  Microscopic hematuria.    Social history:  Lives in [place], [place].  Single.  Has 2 children.  Does not work.  Smoked 1 to 1-1/2 packs of cigarettes daily for 40 years; quit 10 years ago when he was diagnosed with SLE.  Social alcohol.  No illicit drugs.    Family history:  Negative for cancers or blood dyscrasias.      Health maintenance:  PCP in [place], [place].  Says that he has had 3 colonoscopies done in [place], [place]; last colonoscopy was 5-6 years ago which showed a few colon polyps.   History reviewed. No pertinent surgical history.   Social History     Socioeconomic History    Marital status: Single   Tobacco Use    Smoking status: Former     Current packs/day: 0.00     Types: Cigarettes     Quit date: 12/12/2008     Years since quitting: 15.2    Smokeless tobacco: Never   Substance and Sexual Activity    Alcohol use: Yes     Alcohol/week: 1.0 - 2.0 standard drink of alcohol     Types: 1 - 2 Cans of beer per week    Drug use: Never      History reviewed. No pertinent family history.     Reason for Follow-up:  Monoclonal gammopathy   Anemia of CKD  Diabetic nephropathy, CKD stage IIIA/IIIb/4, nephrotic range protein urea, SLE    History of Present Illness:   Monoclonal gammopathy        Oncologic/Hematologic History:  Oncology History    No history exists.   63-year-old gentleman, referred by KATHIE Tyler, for evaluation of monoclonal  gammopathy.      07/11/2023: Nephrology office note:  CKD, stage 3b (GFR 30); NIDDM with diabetic CKD; hypertension; nephrotic range proteinuria; anemia of CKD; SLE   Nephrotic range proteinuria,18.2 g daily  History of allergy to ACE inhibitor  Kidney biopsy had to be canceled due to uncontrolled blood pressure  SPEP showed monoclonal protein  Anemia stable    Labs reviewed:  -02/27/2023:  SPEP:  Possible monoclonal protein (M protein) present; recommend serum immunofixation  -05/02/2023:  BUN 23, normal; creatinine 2.40, elevated; estimated GFR 29.4 mL/minute; hemoglobin 9.9; MCV 94.7, normal  -07/05/2023:  Albumin 3.2, low; creatinine 2.60, elevated; total protein 6.0, low; BUN 28.6, elevated; magnesium, uric acid normal; 25 hydroxy vitamin-D level 12 ng/mL, low  -07/05/2023:  CBC:  Hemoglobin 9.5; MCV normal  -12/12/2023:  Kidney biopsy, right kidney by IR  -12/12/2023:  Hemoglobin 9.2, MCV normal; BUN 35.9; creatinine 3.65; albumin 2.7    12/15/2023:   Presents for initial medical oncology/hematology consultation, accompanied by a male , probably his son.  In no acute discomfort.  Overall, feels well and healthy.  ECOG 0.  Some nausea and diarrhea without any blood.  Good appetite.  Good energy.  No unusual headaches, focal neurological symptoms, chest pain, cough, dyspnea, recurrent fevers or chills, drenching night sweats, anorexia, unintentional weight loss, abdominal pain, nausea, vomiting, bone pains, etc..  He has underlying CKD for which she underwent kidney biopsy recently.  Says that he was diagnosed with SLE 10 years ago; has been off treatment for last 7-8 years, probably, currently, in remission; he has a few scars in the face as residual.      Interval History:  [No matching plan found]   [No matching plan found]     03/19/2024:   -01/22/2024: Folic acid level 27.9, normal (was 6.4, low, on 12/15/2023)  -03/14/2024:  IgG, IgA, IgM level normal; no monoclonal bands on SPEP and JOSEFINA; kappa  9.27, elevated; lambda 6.6, elevated; kappa/lambda ratio 1.40, remains normal  Presents for follow-up visit.  Doing well.  No complaints.  Some leg swelling.  This is chronic.  No undue weakness or fatigue.  Follows up with Nephrology.      Medications:  Current Outpatient Medications on File Prior to Visit   Medication Sig Dispense Refill    amLODIPine (NORVASC) 10 MG tablet Take 10 mg by mouth once daily.      dapagliflozin propanediol (FARXIGA) 10 mg tablet Take 10 mg by mouth once daily.      dulaglutide (TRULICITY) 3 mg/0.5 mL pen injector Inject 3 mg into the skin every 7 days.      ergocalciferol (VITAMIN D2) 50,000 unit Cap Take 50,000 Units by mouth every 7 days.      folic acid (FOLVITE) 1 MG tablet Take 1 tablet (1 mg total) by mouth once daily. 30 tablet 0    furosemide (LASIX) 20 MG tablet Take 20 mg by mouth once daily.      glimepiride (AMARYL) 1 MG tablet Take 1 mg by mouth before breakfast.      levothyroxine (SYNTHROID) 25 MCG tablet Take 25 mcg by mouth before breakfast.      omeprazole (PRILOSEC) 40 MG capsule Take 40 mg by mouth once daily.      SITagliptin phosphate (JANUVIA) 100 MG Tab Take by mouth once daily.       No current facility-administered medications on file prior to visit.     Review of Systems:   All systems reviewed and found to be negative except for the symptoms detailed above    Physical Examination:   VITAL SIGNS:   Vitals:    03/19/24 1111   BP: 132/77   Pulse: 96   Resp: 18   Temp: 98.1 °F (36.7 °C)       GENERAL:  In no apparent distress.    HEAD:  No signs of head trauma.  EYES:  Pupils are equal.  Extraocular motions intact.    EARS:  Hearing grossly intact.  MOUTH:  Oropharynx is normal.   NECK:  No adenopathy, no JVD.     CHEST:  Chest with clear breath sounds bilaterally.  No wheezes, rales, rhonchi.    CARDIAC:  Regular rate and rhythm.  S1 and S2, without murmurs, gallops, rubs.  VASCULAR:  No Edema.  Peripheral pulses normal and equal in all extremities.  ABDOMEN:   Soft, without detectable tenderness.  No sign of distention.  No   rebound or guarding, and no masses palpated.   Bowel Sounds normal.  MUSCULOSKELETAL:  Good range of motion of all major joints. Extremities without clubbing, cyanosis or edema.    NEUROLOGIC EXAM:  Alert and oriented x 3.  No focal sensory or strength deficits.   Speech normal.  Follows commands.  PSYCHIATRIC:  Mood normal.    Results for orders placed or performed during the hospital encounter of 12/12/23   CBC Auto Differential    Narrative    The following orders were created for panel order CBC Auto Differential.  Procedure                               Abnormality         Status                     ---------                               -----------         ------                     CBC with Differential[[id number]]       Abnormal            Final result                 Please view results for these tests on the individual orders.   CBC with Differential   Result Value Ref Range    WBC 12.82 (H) 4.50 - 11.50 x10(3)/mcL    RBC 3.27 (L) 4.70 - 6.10 x10(6)/mcL    Hgb 9.2 (L) 14.0 - 18.0 g/dL    Hct 29.9 (L) 42.0 - 52.0 %    MCV 91.4 80.0 - 94.0 fL    MCH 28.1 27.0 - 31.0 pg    MCHC 30.8 (L) 33.0 - 36.0 g/dL    RDW 13.9 11.5 - 17.0 %    Platelet 400 130 - 400 x10(3)/mcL    MPV 10.5 (H) 7.4 - 10.4 fL    Neut % 77.5 %    Lymph % 13.7 %    Mono % 6.2 %    Eos % 1.2 %    Basophil % 0.9 %    Lymph # 1.75 0.6 - 4.6 x10(3)/mcL    Neut # 9.96 (H) 2.1 - 9.2 x10(3)/mcL    Mono # 0.79 0.1 - 1.3 x10(3)/mcL    Eos # 0.15 0 - 0.9 x10(3)/mcL    Baso # 0.11 <=0.2 x10(3)/mcL    IG# 0.06 (H) 0 - 0.04 x10(3)/mcL    IG% 0.5 %    NRBC% 0.0 %     Results for orders placed or performed during the hospital encounter of 12/12/23   Comprehensive Metabolic Panel   Result Value Ref Range    Sodium Level 137 136 - 145 mmol/L    Potassium Level 5.5 (H) 3.5 - 5.1 mmol/L    Chloride 109 (H) 98 - 107 mmol/L    Carbon Dioxide 20 (L) 23 - 31 mmol/L    Glucose Level 177 (H) 82 - 115  mg/dL    Blood Urea Nitrogen 35.9 (H) 8.4 - 25.7 mg/dL    Creatinine 3.65 (H) 0.73 - 1.18 mg/dL    Calcium Level Total 9.0 8.8 - 10.0 mg/dL    Protein Total 6.6 5.8 - 7.6 gm/dL    Albumin Level 2.7 (L) 3.4 - 4.8 g/dL    Globulin 3.9 (H) 2.4 - 3.5 gm/dL    Albumin/Globulin Ratio 0.7 (L) 1.1 - 2.0 ratio    Bilirubin Total 0.2 <=1.5 mg/dL    Alkaline Phosphatase 101 40 - 150 unit/L    Alanine Aminotransferase 12 0 - 55 unit/L    Aspartate Aminotransferase 10 5 - 34 unit/L    eGFR 18 mls/min/1.73/m2       Assessment:  Problem List Items Addressed This Visit          Renal/    Diabetic nephropathy - Primary    Nephrotic range proteinuria       Immunology/Multi System    SLE (systemic lupus erythematosus related syndrome)       Oncology    Monoclonal gammopathy    Anemia in chronic kidney disease (CKD)       63-year-old gentleman with history of NIDDM, diabetic nephropathy, CKD, stage 3a/3b/4 at different times, worsening, nephrotic range proteinuria, S/P kidney biopsy 12/12/2023 (nodular diabetic glomerulosclerosis, class III),     Referred for evaluation of questionable monoclonal gammopathy noted on SPEP (02/27/2023):  -12/12/2023: Right kidney biopsy:  Limited biopsy; nodular diabetic glomerulosclerosis, class III  -12/15/2023: Hemoglobin 9.6; ferritin 72.11; transferrin saturation 34%; B12 normal; folate 6.4, low; no hemolysis  -12/15/2023:  SPEP, JOSEFINA:  No monoclonal protein; kappa elevated, lambda elevated, kappa/lambda ratio normal  -12/20/2023:  24 hour urine studies: Nephrotic range proteinuria (67760 mg/24 hours); no M protein in urine  -03/14/2024:  IgG, IgA, IgM level normal; no monoclonal bands on SPEP and JOSEFINA; kappa 9.27, elevated; lambda 6.6, elevated; kappa/lambda ratio 1.40, remains normal  Conclusion:  Patient does not have monoclonal gammopathy      Also, history of anemia of CKD:  -12/15/2023: Hemoglobin 9.6 (stable); ferritin 72.11; transferrin saturation 34%; B12 normal; folate 6.4, low; no  hemolysis  (Chronic anemia due to CKD; iron stores normal; B12 normal; started on folic acid 1 mg daily 12/15/2023; no hemolysis)      History of CKD:  (nodular diabetic glomerulosclerosis on limited kidney biopsy)  -12/12/2023: Right kidney biopsy:  Limited biopsy; nodular diabetic glomerulosclerosis, class III      -history of NIDDM, diabetic nephropathy, nephrotic range proteinuria, history of SLE (treated in the past, apparently in remission at this time)   -12/12/2023: Right kidney biopsy:  Limited biopsy; nodular diabetic glomerulosclerosis, class III      History of SLE; apparently, diagnosed 10 years ago, approximately around 2013; treated in [place], [place]; has been off treatment for 7-8 years; apparently, in remission at this time; has a few facial scars as stigmata.      Plan:  Does not need hematology follow-up.  Refer back to referring providers.  ------------------------------------      -03/14/2024:  IgG, IgA, IgM level normal; no monoclonal bands on SPEP and JOSEFINA; kappa 9.27, elevated; lambda 6.6, elevated; kappa/lambda ratio 1.40, remains normal  >>>  -patient does not have monoclonal gammopathy   -elevated kappa and lambda light chains in blood, are secondary to CKD (kappa/lambda ratio is normal)     -history of anemia of CKD and folic acid deficiency   -started on folic acid 1 mg p.o. q.day on 11/15/2023  -follow-up with renal for AVE therapy as and when needed  -01/22/2024: Folic acid level 27.9, normal (was 6.4, low, on 12/15/2023)    -CKD   -nodular diabetic glomerulosclerosis on limited kidney biopsy  -history of SLE in the past; no immune complex deposition noted on kidney biopsy, although kidney biopsy was limited  -of course, follow-up with nephrology    Does not need hematology follow-up.  Refer back to referring providers.    Above discussed at length with the patient.  All questions answered.  Discussed labs.   He understands and agrees with this plan.    Follow-up:  No  follow-ups on file.

## 2024-03-19 ENCOUNTER — TELEPHONE (OUTPATIENT)
Dept: HEMATOLOGY/ONCOLOGY | Facility: CLINIC | Age: 64
End: 2024-03-19

## 2024-03-19 ENCOUNTER — OFFICE VISIT (OUTPATIENT)
Dept: HEMATOLOGY/ONCOLOGY | Facility: CLINIC | Age: 64
End: 2024-03-19
Attending: INTERNAL MEDICINE
Payer: MEDICAID

## 2024-03-19 VITALS
RESPIRATION RATE: 18 BRPM | OXYGEN SATURATION: 99 % | DIASTOLIC BLOOD PRESSURE: 77 MMHG | BODY MASS INDEX: 27.86 KG/M2 | HEART RATE: 96 BPM | WEIGHT: 183.81 LBS | SYSTOLIC BLOOD PRESSURE: 132 MMHG | HEIGHT: 68 IN | TEMPERATURE: 98 F

## 2024-03-19 DIAGNOSIS — M32.9 SLE (SYSTEMIC LUPUS ERYTHEMATOSUS RELATED SYNDROME): ICD-10-CM

## 2024-03-19 DIAGNOSIS — D63.1 ANEMIA IN CHRONIC KIDNEY DISEASE, UNSPECIFIED CKD STAGE: ICD-10-CM

## 2024-03-19 DIAGNOSIS — N18.9 ANEMIA IN CHRONIC KIDNEY DISEASE, UNSPECIFIED CKD STAGE: ICD-10-CM

## 2024-03-19 DIAGNOSIS — R80.9 NEPHROTIC RANGE PROTEINURIA: ICD-10-CM

## 2024-03-19 DIAGNOSIS — D47.2 MONOCLONAL GAMMOPATHY: ICD-10-CM

## 2024-03-19 DIAGNOSIS — E13.21 DIABETIC NEPHROPATHY ASSOCIATED WITH OTHER SPECIFIED DIABETES MELLITUS: Primary | ICD-10-CM

## 2024-03-19 LAB
ALBUMIN 24H UR ELPH-MRATE: 7213.1 MG/24 H
ALBUMIN/GLOB 24H UR ELPH: 1.63 {RATIO}
ALPHA1 GLOB 24H UR ELPH-MRATE: 698 MG/24 H
ALPHA2 GLOB 24H UR ELPH-MRATE: 1047.1 MG/24 H
B-GLOBULIN 24H UR ELPH-MRATE: 1628.8 MG/24 H
COLLECT DURATION TIME UR: 24 H
GAMMA GLOB 24H UR ELPH-MRATE: 1047.1 MG/24 H
M FLAG M-PROTEIN ISOTYPE MS, 24 HR, U: NEGATIVE
M M-PROTEIN ISOTYPE MS, 24 HR, U: ABNORMAL
PROT 24H UR-MRATE: ABNORMAL MG/24 H
PROT PATTERN 24H UR ELPH-IMP: ABNORMAL
SPECIMEN VOL 24H UR: 2100 ML

## 2024-03-19 PROCEDURE — 3008F BODY MASS INDEX DOCD: CPT | Mod: CPTII,,, | Performed by: INTERNAL MEDICINE

## 2024-03-19 PROCEDURE — 1159F MED LIST DOCD IN RCRD: CPT | Mod: CPTII,,, | Performed by: INTERNAL MEDICINE

## 2024-03-19 PROCEDURE — 99214 OFFICE O/P EST MOD 30 MIN: CPT | Mod: PBBFAC | Performed by: INTERNAL MEDICINE

## 2024-03-19 PROCEDURE — 3078F DIAST BP <80 MM HG: CPT | Mod: CPTII,,, | Performed by: INTERNAL MEDICINE

## 2024-03-19 PROCEDURE — 3075F SYST BP GE 130 - 139MM HG: CPT | Mod: CPTII,,, | Performed by: INTERNAL MEDICINE

## 2024-03-19 PROCEDURE — 99213 OFFICE O/P EST LOW 20 MIN: CPT | Mod: S$PBB,,, | Performed by: INTERNAL MEDICINE

## 2024-03-19 PROCEDURE — 1160F RVW MEDS BY RX/DR IN RCRD: CPT | Mod: CPTII,,, | Performed by: INTERNAL MEDICINE

## 2024-11-20 ENCOUNTER — TELEPHONE (OUTPATIENT)
Dept: TRANSPLANT | Facility: CLINIC | Age: 64
End: 2024-11-20

## 2024-12-05 ENCOUNTER — TELEPHONE (OUTPATIENT)
Dept: TRANSPLANT | Facility: CLINIC | Age: 64
End: 2024-12-05
Payer: MEDICARE

## 2024-12-10 ENCOUNTER — EPISODE CHANGES (OUTPATIENT)
Dept: TRANSPLANT | Facility: CLINIC | Age: 64
End: 2024-12-10

## 2024-12-10 ENCOUNTER — TELEPHONE (OUTPATIENT)
Dept: TRANSPLANT | Facility: CLINIC | Age: 64
End: 2024-12-10
Payer: MEDICARE

## 2024-12-10 DIAGNOSIS — Z76.82 ORGAN TRANSPLANT CANDIDATE: ICD-10-CM

## 2024-12-10 DIAGNOSIS — N18.6 END STAGE RENAL DISEASE: Primary | ICD-10-CM

## 2025-01-06 ENCOUNTER — TELEPHONE (OUTPATIENT)
Dept: TRANSPLANT | Facility: CLINIC | Age: 65
End: 2025-01-06

## 2025-01-08 ENCOUNTER — TELEPHONE (OUTPATIENT)
Dept: TRANSPLANT | Facility: CLINIC | Age: 65
End: 2025-01-08

## 2025-01-08 NOTE — TELEPHONE ENCOUNTER
MA notes per Pre Dialysis adherence form     FOR THE PAST THREE MONTHS:    0-Appt Adhrence  0-No show    No concerns with labs, care giving, transportation, or mental health    Scanned in pt's media     Jeannette Rich  Abdominal Transplant MA

## 2025-01-15 ENCOUNTER — TELEPHONE (OUTPATIENT)
Dept: TRANSPLANT | Facility: CLINIC | Age: 65
End: 2025-01-15
Payer: MEDICARE

## 2025-01-28 ENCOUNTER — TELEPHONE (OUTPATIENT)
Dept: TRANSPLANT | Facility: CLINIC | Age: 65
End: 2025-01-28
Payer: MEDICARE

## 2025-01-29 DIAGNOSIS — N18.6 END STAGE RENAL DISEASE: Primary | ICD-10-CM

## 2025-01-29 DIAGNOSIS — Z76.82 ORGAN TRANSPLANT CANDIDATE: ICD-10-CM

## 2025-02-06 DIAGNOSIS — Z01.818 OTHER SPECIFIED PRE-OPERATIVE EXAMINATION: Primary | ICD-10-CM

## 2025-02-06 DIAGNOSIS — N18.4 CHRONIC KIDNEY DISEASE, STAGE IV (SEVERE): ICD-10-CM

## 2025-02-06 DIAGNOSIS — N18.4 CHRONIC KIDNEY DISEASE, STAGE IV (SEVERE): Primary | ICD-10-CM

## 2025-02-20 ENCOUNTER — OFFICE VISIT (OUTPATIENT)
Dept: TRANSPLANT | Facility: CLINIC | Age: 65
End: 2025-02-20
Payer: MEDICARE

## 2025-02-20 ENCOUNTER — HOSPITAL ENCOUNTER (OUTPATIENT)
Dept: RADIOLOGY | Facility: HOSPITAL | Age: 65
Discharge: HOME | End: 2025-02-20
Payer: MEDICARE

## 2025-02-20 ENCOUNTER — HOSPITAL ENCOUNTER (OUTPATIENT)
Dept: RADIOLOGY | Facility: HOSPITAL | Age: 65
Discharge: HOME | End: 2025-02-20
Attending: NURSE PRACTITIONER
Payer: MEDICARE

## 2025-02-20 VITALS
RESPIRATION RATE: 18 BRPM | OXYGEN SATURATION: 98 % | HEART RATE: 99 BPM | TEMPERATURE: 97 F | HEIGHT: 66 IN | BODY MASS INDEX: 31.57 KG/M2 | WEIGHT: 196.44 LBS | SYSTOLIC BLOOD PRESSURE: 164 MMHG | DIASTOLIC BLOOD PRESSURE: 73 MMHG

## 2025-02-20 DIAGNOSIS — N18.5 CKD (CHRONIC KIDNEY DISEASE), STAGE V: ICD-10-CM

## 2025-02-20 DIAGNOSIS — Z76.82 ORGAN TRANSPLANT CANDIDATE: ICD-10-CM

## 2025-02-20 DIAGNOSIS — E11.21 DIABETIC NEPHROPATHY ASSOCIATED WITH TYPE 2 DIABETES MELLITUS: ICD-10-CM

## 2025-02-20 DIAGNOSIS — Z01.818 PRE-TRANSPLANT EVALUATION FOR KIDNEY TRANSPLANT: Primary | ICD-10-CM

## 2025-02-20 DIAGNOSIS — I10 PRIMARY HYPERTENSION: ICD-10-CM

## 2025-02-20 DIAGNOSIS — N18.6 END STAGE RENAL DISEASE: ICD-10-CM

## 2025-02-20 DIAGNOSIS — M32.9 SLE (SYSTEMIC LUPUS ERYTHEMATOSUS RELATED SYNDROME): ICD-10-CM

## 2025-02-20 PROBLEM — D47.2 MONOCLONAL GAMMOPATHY: Status: RESOLVED | Noted: 2023-12-15 | Resolved: 2025-02-20

## 2025-02-20 PROCEDURE — 72192 CT PELVIS W/O DYE: CPT | Mod: TC,TXP

## 2025-02-20 PROCEDURE — 93978 VASCULAR STUDY: CPT | Mod: TC,TXP

## 2025-02-20 PROCEDURE — 71046 X-RAY EXAM CHEST 2 VIEWS: CPT | Mod: TC,TXP

## 2025-02-20 RX ORDER — SEVELAMER CARBONATE 800 MG/1
1600 TABLET, FILM COATED ORAL
COMMUNITY

## 2025-02-20 RX ORDER — SODIUM BICARBONATE 650 MG/1
1300 TABLET ORAL 2 TIMES DAILY
COMMUNITY

## 2025-02-20 RX ORDER — CALCITRIOL 0.25 UG/1
0.25 CAPSULE ORAL DAILY
COMMUNITY

## 2025-02-20 RX ORDER — CARVEDILOL 25 MG/1
25 TABLET ORAL 2 TIMES DAILY
COMMUNITY

## 2025-02-20 RX ORDER — ATORVASTATIN CALCIUM 80 MG/1
80 TABLET, FILM COATED ORAL DAILY
COMMUNITY

## 2025-02-20 NOTE — PROGRESS NOTES
Transplant Recipient Adult Psychosocial Assessment    bigg Lancaster present during assessment with patients permission.     Qasim Harry  [address] [facility] [address]  Telephone Information:   Mobile [phone]   Home  [phone] (home)  Work  There is no work phone number on file.  E-mail  No e-mail address on record    Sex: male  YOB: 1960  Age: 65 y.o.    Encounter Date: 2025  [place] Citizen: yes  Primary Language: English   Needed: no    Emergency Contact:  Name: Vale Harry  Relationship: daughter  Address: [address] [place] [id number]  Phone Numbers: [phone] (mobile)    Family/Social Support:   Number of dependents/: 0  Marital history: Single  Other family dynamics: Parents , 2 adult children, 4 brothers and 3 sisters    Household Composition: Patient lives alone.     Do you and your caregivers have access to reliable transportation? yes  PRIMARY CAREGIVER: bigg Lancaster will be primary caregiver, phone number [phone].      provided in-depth information to patient and caregiver regarding pre- and post-transplant caregiver role.   strongly encourages patient and caregiver to have concrete plan regarding post-transplant care giving, including back-up caregiver(s) to ensure care giving needs are met as needed.    Patient and Caregiver states understanding all aspects of caregiver role/commitment and is able/willing/committed to being caregiver to the fullest extent necessary.    Patient and Caregiver verbalizes understanding of the education provided today and caregiver responsibilities.         remains available. Patient and Caregiver agree to contact  in a timely manner if concerns arise.      Able to take time off work without financial concerns: yes.     Additional Significant Others who will Assist with Transplant: None. Patient encouraged to find back-up  caregiver (s).       Living Will: no  Healthcare Power of : no  Advance Directives on file:   provided patient with copy of LW/HCPA documents and provided education on completion of forms.    Living Donors: No. Education and resource information given to patient.    Highest Education Level: Grade School (0-8)   Reading Ability: cannot read  Reports difficulty with: reading and writing  Learns Best By:  Hands-on instructions      Status: no  VA Benefits: no     Working for Income: No  If no, reason not working: Disability    Disabled: yes: date disability began: , due to: health issues.    Monthly Income:   Disability: $2,000  Able to afford all costs now and if transplanted, including medications: yes  Patient and Caregiver verbalizes understanding of personal responsibilities related to transplant costs and the importance of having a financial plan to ensure that patients transplant costs are fully covered.      provided fundraising information/education.  Patient and Caregiver verbalizes understanding.   remains available.    Insurance:   Payer/Plan Subscr  Sex Relation Sub. Ins. ID Effective Group Num   1. [employer]* BEN GIRON 1960 Male Self [id number]                                    [id number]     Primary Insurance (for UNOS reporting): Public Insurance - Medicare & Choice  Secondary Insurance (for UNOS reporting): None  Patient and Caregiver verbalizes clear understanding that patient may experience difficulty obtaining and/or be denied insurance coverage post-surgery. This includes and is not limited to disability insurance, life insurance, health insurance, burial insurance, long term care insurance, and other insurances.    Patient and Caregiver also reports understanding that future health concerns related to or unrelated to transplantation may not be covered by patient's insurance.  Resources and information  provided and reviewed.      Patient and Caregiver provides verbal permission to release any necessary information to outside resources for patient care and discharge planning.  Resources and information provided are reviewed.      Dialysis Adherence: Patient is pre-dialysis.    Infusion Service: patient utilizing? no  Home Health: patient utilizing? no  DME: no Patient is unable to walk long-distances.   Pulmonary/Cardiac Rehab: None   ADLS:  - Patient can ambulate, complete ADL's, drive and manage medications without assistance.    Adherence:  Adherence education and counseling provided.     Per History Section:  Past Medical History:   Diagnosis Date    Anemia, unspecified     CKD (chronic kidney disease)     Diabetes mellitus     Diabetes mellitus, type 2     Hyperparathyroidism, unspecified     Hypertension     Isolated non-nephrotic proteinuria     Macroscopic hematuria     Peripheral edema     Systemic lupus erythematosus, organ or system involvement unspecified     Vitamin D deficiency      Social History     Tobacco Use    Smoking status: Former     Current packs/day: 0.00     Types: Cigarettes     Quit date: 2008     Years since quittin.2    Smokeless tobacco: Never   Substance Use Topics    Alcohol use: Yes     Alcohol/week: 1.0 - 2.0 standard drink of alcohol     Types: 1 - 2 Cans of beer per week     Social History     Substance and Sexual Activity   Drug Use Never     Social History     Substance and Sexual Activity   Sexual Activity Not Currently       Per Today's Psychosocial:  Tobacco:  Patient reported he's a former smoker.   Patient smoked for over 30 years prior to quitting 10 years ago.   Alcohol:  Occasionally . Last drink was 8 months ago  Illicit Drugs/Non-prescribed Medications: none, patient denies any use.    Patient and Caregiver states clear understanding of the potential impact of substance use as it relates to transplant candidacy and is aware of possible random substance  screening.  Substance abstinence/cessation counseling, education and resources provided and reviewed.     Arrests/DWI/Treatment/Rehab: patient denies    Psychiatric History:    Mental Health:  Patient denies  Psychiatrist/Counselor: Patient denies  Medications:  Patient denies  Suicide/Homicide Issues: Patient denies   Safety at home: Patient reported he feels safe at home.     Knowledge: Patient and Caregiver states having clear understanding and realistic expectations regarding the potential risks and potential benefits of organ transplantation and organ donation, agrees to discuss with health care team members and support system members, and to utilize available resources and express questions and/or concerns in order to further facilitate the pt informed decision-making.  Resources and information provided and reviewed.     Patient and Caregiver is aware of [facility]'s affiliation and/or partnership with agencies in home health care, LTAC, SNF, [facility], and other hospitals and clinics.    Understanding: Patient and Caregiver reports having a clear understanding of the many lifetime commitments involved with being a transplant recipient, including costs, compliance, medications, lab work, procedures, appointments, concrete and financial planning, preparedness, timely and appropriate communication of concerns, abstinence (ETOH, tobacco, illicit non-prescribed drugs), adherence to all health care team recommendations, support system and caregiver involvement, appropriate and timely resource utilization and follow-through, mental health counseling as needed/recommended, and patient and caregiver responsibilities.  Social Service Handbook, resources and detailed educational information provided and reviewed.  Educational information provided.    Patient and Caregiver also reports current and expected compliance with health care regime and states having a clear understanding of the importance of compliance.      Patient and  Caregiver reports a clear understanding that risks and benefits may be involved with organ transplantation and with organ donation.      Patient and Caregiver also reports clear understanding that psychosocial risk factors may affect patient, and include but are not limited to feelings of depression, generalized anxiety, anxiety regarding dependence on others, post traumatic stress disorder, feelings of guilt and other emotional and/or mental concerns, and/or exacerbation of existing mental health concerns.  Detailed resources provided and discussed.     Patient and Caregiver agrees to access appropriate resources in a timely manner as needed and/or as recommended, and to communicate concerns appropriately.  Patient and Caregiver also reports a clear understanding of treatment options available.      reviewed education, provided additional information, and answered questions.    Feelings or Concerns: Patient denies having any concerns and/or overwhelming feelings regarding transplant currently.    Coping: Identify Patient & Caregiver Strategies to Jachin:   1. In the past - Family   2. Currently & Pre-transplant - family   3. At the time of surgery - Family   4. During post-Transplant & Recovery Period - Family    Goals: Live to 89 years old.      Interview Behavior: Patient and Caregiver presents as alert and oriented x 4, pleasant, good eye contact, well groomed, recall good, concentration/judgement good, average intelligence, calm, communicative, cooperative, and asking and answering questions appropriately.          Transplant Social Work - Candidacy  Assessment/Plan:     Psychosocial Suitability: Patient presents as a good candidate for kidney transplant at this time. Based on psychosocial risk factors, patient presents as medium risk, due to limited family support . Patient has some family  support, good adherence, appropriate coping skills, medical insurance, reliable transportation, being able to  financially can afford medications and not using any substances unless prescribed.    Recommendations/Additional Comments:  explained in detail with patient and caregiver lodging requirements. Patient and caregiver informed patient would have to stay within 1.5 hours of this hospital after transplant for 4-5 weeks with caregiver.  provided patient with different lodging options to review. Patient and caregiver informed patient is responsible for any fees associated with lodging after transplant.     Patient is highly motivated to receive kidney transplant. SW recommends patient contact insurance for lodging benefits, conduct fundraising to assist patient with pay for cost of medications, food, gas, and other transplant related needs. SW recommends that patient remain aware of potential mental health concerns and contact the team if any concerns arise. Patient denied needing or wanting mental health resources or referrals at this time. Patient agreed to contact  team as needed. SW recommends that patient remain abstinent from tobacco, ETOH, and drug use. SW supports patient continued adherence. SW remains available to answer any questions or concerns that arise as the patient moves through the transplant process.    Nicole Cabral LCSW

## 2025-02-20 NOTE — PROGRESS NOTES
PRE-TRANSPLANT INFECTIOUS DISEASE CONSULT    Reason for Visit:  Pre-transplant evaluation  Referring Provider: No ref. provider found     History of Present Illness:    65 y.o. male with a history of CKD presents for pre-kidney transplant evaluation.    Infectious History:  Recent hospital admissions: No  Recent infections: No  Recent or current antibiotic use: No  History of recurrent infections *(sinus / pneumonia / UTI / SBP)*: No  History of diabetic foot wound or bone/joint infection: No  Recent dental infections, issues or procedures: No  History of chicken pox: Yes  History of shingles: No  History of STI: No  History of COVID infection: No    History of Immunosuppression:  Prior chemotherapy / immunosuppression: No  Prior transplant: No  History of splenectomy: No    Tuberculosis:  Prior screening for latent TB: No  Prior diagnosis of latent TB: No  Risk factors for TB *known exposure, incarceration, homelessness*: No    Geographical exposures:  Currently lives in [place], [place] with self  Lived in the following states: [place],  Lived or travelled to the [facility] [place]: No  International travel: No  Travel-associated illness: No    Social/Environmental:  Occupation:    Pets: No   Livestock: No  Fishing / hunting: No  Hobbies: none  Water: [place] water  Consumption of raw/undercooked meat or seafood?  No  Tobacco: No  Alcohol: No  Recreational drug use:  No  Sexual partners: none      Past Histories:   Past Medical History:   Diagnosis Date    Anemia, unspecified     CKD (chronic kidney disease)     Diabetes mellitus     Diabetes mellitus, type 2     Hyperparathyroidism, unspecified     Hypertension     Isolated non-nephrotic proteinuria     Macroscopic hematuria     Peripheral edema     Systemic lupus erythematosus, organ or system involvement unspecified     Vitamin D deficiency      No past surgical history on file.  No family history on file.  Social History[1]  Review of patient's allergies indicates:    Allergen Reactions    Ace inhibitors          Current antibiotics:  Antibiotics (From admission, onward)      None              Review of Systems  Review of Systems   Constitutional: Negative for chills, fever, malaise/fatigue and night sweats.   Cardiovascular:  Negative for chest pain.   Respiratory:  Negative for cough, hemoptysis, shortness of breath, sputum production and wheezing.    Skin:  Negative for rash and suspicious lesions.   Gastrointestinal:  Negative for abdominal pain, constipation, diarrhea, heartburn, nausea and vomiting.   Genitourinary:  Negative for dysuria and hematuria.        Objective  Physical Exam  Constitutional:       General: He is not in acute distress.     Appearance: Normal appearance. He is well-developed. He is obese. He is not ill-appearing, toxic-appearing or diaphoretic.   HENT:      Head: Normocephalic and atraumatic.      Mouth/Throat:      Lips: Pink. No lesions.      Mouth: Mucous membranes are moist. No injury.      Dentition: Abnormal dentition (multiple missing teeth). Does not have dentures (uppert partial). No gingival swelling, dental caries or dental abscesses.      Tongue: No lesions.      Palate: No lesions.      Pharynx: Oropharynx is clear.   Cardiovascular:      Rate and Rhythm: Normal rate and regular rhythm.      Heart sounds: Normal heart sounds. No murmur heard.     No friction rub. No gallop.   Pulmonary:      Effort: Pulmonary effort is normal. No respiratory distress.      Breath sounds: Normal breath sounds. No wheezing or rales.   Abdominal:      General: Bowel sounds are normal. There is no distension.      Palpations: Abdomen is soft. There is no mass.      Tenderness: There is no abdominal tenderness. There is no guarding or rebound.   Skin:     General: Skin is warm and dry.   Neurological:      Mental Status: He is alert and oriented to person, place, and time.   Psychiatric:         Behavior: Behavior normal.         Labs:    CBC:   Lab Results  "  Component Value Date    WBC 10.31 12/15/2023    HGB 9.6 (L) 12/15/2023    HCT 31.7 (L) 12/15/2023    MCV 92.7 12/15/2023     12/15/2023       Syphilis screening: No results found for: "RPR", "PRPQ", "FTAABS", "TREPABIGMIGG"     TB screening: No results found for: "TBGOLDPLUS", "TSPOTSCREN"    HIV screening: No results found for: "MCW75OJRI"    Strongyloides IgG: No results found for: "STRONGANTIGG"    Hepatitis Serologies: No results found for: "HEPAIGG", "HEPBCAB", "HEPBSAB", "HEPBSURFABQU", "HBSAG", "HEPCAB"     Varicella IgG: No results found for: "VARICELLAINT"        There is no immunization history on file for this patient.     Immunization History:  Received all childhood vaccines: Yes  All household members receive annual flu vaccine: No  All household members are up to date on COVID vaccine: No    Assessment and Plan    1. Risks of Infection: Available serologies were reviewed. No unusual risks of infection or significant barriers to transplantation were identified from the infectious disease standpoint given the information available at this time.    - Acute infectious issues: None   - Pending serologies: Hepatitis A Ab, Hepatitis B surface Ab, Hepatitis B core Ab, Hepatitis B surface Ag, Hepatitis C Ab, HIV, Quantiferon gold / T-spot, Treponema Ab / RPR / FTA, Strongyloides IgG, and VZV IgG   - Please call if any pending serologic testing is positive.    2. Immunizations:  Based on the patient's immunization history and serologies, the following immunizations are recommended:  - Hepatitis A    Patient does not have immunity to hepatitis A    Vaccination ordered today: Yes   - Hepatitis B    Patient does not have immunity to hepatitis B    Vaccination ordered today: Yes   - COVID    Current CDC vaccination recommendations were discussed with the patient - rx for most recent update given   - Annual high dose influenza     Vaccination ordered today: Yes   - Prevnar 20    Vaccination ordered today: " No. Reason for not ordering: Vaccination up to date   - Tdap    Vaccination ordered today: No. Reason for not ordering: Vaccination up to date   - Shingrix    Vaccination ordered today: Yes x 1 dose to complete series    Recommended Pre-Transplant Immunization Schedule   Vaccine  0m 1m 2m 6m   Pneumococcal conjugate vaccine (Prevnar 20) X      Tetanus-diphtheria-pertussis (Tdap)* X      Hepatitis A Vaccine (Havrix)** X   X   Hepatitis B Vaccine (Heplisav)** X X     Influenza (annual) X      Zoster Recombinant Vaccine (Shingrix) X  X           *Administer booster every 10 years.       **Administer if no immunity demonstrated on serologies               Patient will receive vaccines at local pharmacy. A written prescription was provided for all vaccine doses.     3. Counseling:   I discussed with the patient the risk for increased susceptibility to infections following transplantation including increased risk for infection right after transplant and if rejection should occur.  The patient has been counseled on the importance of vaccinations to decrease risk of infection and severe illness. Specific guidance has been provided to the patient regarding the patient's occupation, hobbies and activities to avoid future infectious complications.     4. Transplant Candidacy: Based on available information, there are no identified significant barriers to transplantation from an infectious disease standpoint.  Final determination of transplant candidacy will be made once evaluation is complete and reviewed by the Selection Committee.      Follow up with infectious disease as needed.       The total time for evaluation and management services performed on 2025 was greater than 45 minutes.               [1]   Social History  Tobacco Use    Smoking status: Former     Current packs/day: 0.00     Types: Cigarettes     Quit date: 2008     Years since quittin.2    Smokeless tobacco: Never   Substance Use Topics     Alcohol use: Yes     Alcohol/week: 1.0 - 2.0 standard drink of alcohol     Types: 1 - 2 Cans of beer per week    Drug use: Never

## 2025-02-20 NOTE — LETTER
February 21, 2025        Kiet Anders  [address]  [address]  [place] [address]  Phone: [phone]  Fax: [phone]             [facility] Transplant 1st Fl  [address]  [facility] [address]  Phone: [phone]   Patient: Qasim Harry   MR Number: [id number]   YOB: 1960   Date of Visit: 2/20/2025       Dear Dr. Kiet Anders    Thank you for referring Qasim Harry to me for evaluation. Attached you will find relevant portions of my assessment and plan of care.    If you have questions, please do not hesitate to call me. I look forward to following Qasim Harry along with you.    Sincerely,    Francisca Bowers NP    Enclosure    If you would like to receive this communication electronically, please contact [email or web address] or [phone] to request [employer] in [employer] access.    [employer] Link is a tool which provides read-only access to select patient information with whom you have a relationship. Its easy to use and provides real time access to review your patients record including encounter summaries, notes, results, and demographic information.    If you feel you have received this communication in error or would no longer like to receive these types of communications, please e-mail [email or web address]

## 2025-02-20 NOTE — PROGRESS NOTES
Transplant Surgery  Kidney Transplant Recipient Evaluation    Referring Physician: Kiet Anders  Current Nephrologist: Kiet Anders    Subjective:     Reason for Visit: evaluate transplant candidacy    History of Present Illness: Qasim Harry is a 65 y.o. year old male undergoing transplant evaluation.    Dialysis History: Qasim is on hemodialysis.      Transplant History: N/A    Etiology of Renal Disease: Diabetes Mellitus - Type II (based on medical records from referral).    External provider notes reviewed: Yes    Review of Systems   Constitutional:  Negative for activity change and appetite change.   HENT:  Negative for congestion and tinnitus.    Eyes:  Negative for redness and visual disturbance.   Respiratory:  Negative for cough and shortness of breath.    Cardiovascular:  Negative for chest pain and palpitations.   Gastrointestinal:  Negative for abdominal distention and abdominal pain.   Endocrine: Negative for polydipsia and polyuria.   Genitourinary:  Negative for decreased urine volume and dysuria.   Musculoskeletal:  Negative for arthralgias and back pain.   Skin:  Negative for pallor and rash.   Allergic/Immunologic: Negative for environmental allergies and immunocompromised state.   Neurological:  Negative for tremors and headaches.   Hematological:  Negative for adenopathy. Does not bruise/bleed easily.   Psychiatric/Behavioral:  Negative for behavioral problems and confusion.      Objective:     Physical Exam:  Constitutional:   Vitals reviewed: yes   Well-nourished and well-groomed: yes  Eyes:   Sclerae icteric: no   Extraocular movements intact: yes  GI:    Bowel sounds normal: yes   Tenderness: no    If yes, quadrant/location: not applicable   Palpable masses: no    If yes, quadrant/location: not applicable   Hepatosplenomegaly: no   Ascites: no   Hernia: no    If yes, type/location: not applicable   Surgical scars: no    If yes, type/location: not applicable  Resp:   Effort normal:  yes   Breath sounds normal: yes    CV:   Regular rate and rhythm: yes   Heart sounds normal: yes   Femoral pulses normal: yes   Extremities edematous: no  Skin:   Rashes or lesions present: no    If yes, describe:not applicable   Jaundice:: no    Musculoskeletal:   Gait normal: yes   Strength normal: yes  Psych:   Oriented to person, place, and time: yes   Affect and mood normal: yes    Additional comments: not applicable    Diagnostics:  The following labs have been reviewed: CBC  BMP  The following radiology images have been independently reviewed and interpreted:     Counseling: We provided Qasim Harry with a group education session today.  We discussed kidney transplantation at length with him, including risks, potential complications, and alternatives in the management of his renal failure.  The discussion included complications related to anesthesia, bleeding, infection, primary nonfunction, and ATN.  I discussed the typical postoperative course, length of hospitalization, the need for long-term immunosuppression, and the need for long-term routine follow-up.  I discussed living-donor and -donor transplantation and the relative advantages and disadvantages of each.  I also discussed average waiting times for both living donation and  donation.  I discussed national and center-specific survival rates.  I also mentioned the potential benefit of multicenter listing to candidates listed with centers within more than one organ procurement organization.  All questions were answered.    Patient advised that it is recommended that all transplant candidates, and their close contacts and household members receive Covid vaccination.    Final determination of transplant candidacy will be made once evaluation is complete and reviewed by the Kidney & Kidney/Pancreas Selection Committee.    Coronavirus disease (COVID-19) caused by severe acute respiratory virus coronavirus 2 (SARS-C0V 2) is associated with  increased mortality in solid organ transplant recipients (SOT) compared to non-transplant patients. Vaccine responses to vaccination are depressed against SARS-CoV2 compared to normal individuals but improve with third vaccination doses. Vaccination prior to SOT provides both the best opportunity for transplant candidates to develop protective immunity and to reduce the risk of serious COVID19 infections post transplantation. Organ transplant candidates at [facility] will be required to receive SARS-CoV-2 vaccination prior to being listed with a an active status, whenever possible. Exceptions will be made for disability related reasons or for sincerely held Holiness beliefs.          Transplant Surgery - Candidacy   Assessment/Plan:   Qasim Harry has end stage renal disease (ESRD) on dialysis. I see no surgical contraindication to placing a kidney transplant. Based on available information, Qasim Harry is a suitable kidney transplant candidate.     Additional testing to be completed according to the Written Order Guidelines for Adult Pre-kidney and Pancreas Transplant Evaluation (KI-02).  Interpretation of tests and discussion of patient management involves all members of the multidisciplinary transplant team.    Santosh Paula MD

## 2025-02-20 NOTE — PROGRESS NOTES
Transplant Nephrology  Kidney Transplant Recipient Evaluation    Referring Physician: Kiet Anders  Current Nephrologist: Kiet Anders    Subjective:   CC:  Initial evaluation of kidney transplant candidacy.    HPI:  Mr. Harry is a 65 y.o. year old Black or  male who has presented to be evaluated as a potential kidney transplant recipient.  He has advanced kidney disease secondary to diabetic nephropathy, biopsy proven.  Patient is currently pre-dialysis, does not have a dialysis access.     DM2 and HTN for over 10 years. Denies retinopathy, neuropathy, or gastroparesis. Has been well controlled with A1c today 6.0. Had renal biopsy done 12/2023 with nodular diabetic glomerulosclerosis, class III.    Saw heme onc in 2024 for possible MGUS with the conclusion that he did not have MGUS.     History of SLE, he reports stopping meds several years ago. Does not currently see rheumatologist.    Here today with his daughter. He remains active without functional impairments. Uses no assistive devices. Does not do any dedicated exercise. No potential donors at this time.    Denies MI, CVA, DVT/PE, or malignancy history.     Current Medications[1]    Past Medical History:   Diagnosis Date    Anemia, unspecified     CKD (chronic kidney disease)     Diabetes mellitus     Diabetes mellitus, type 2     Hyperparathyroidism, unspecified     Hypertension     Isolated non-nephrotic proteinuria     Macroscopic hematuria     Peripheral edema     Systemic lupus erythematosus, organ or system involvement unspecified     Vitamin D deficiency        Past Medical and Surgical History: Mr. Harry  has a past medical history of Anemia, unspecified, CKD (chronic kidney disease), Diabetes mellitus, Diabetes mellitus, type 2, Hyperparathyroidism, unspecified, Hypertension, Isolated non-nephrotic proteinuria, Macroscopic hematuria, Peripheral edema, Systemic lupus erythematosus, organ or system involvement unspecified,  "and Vitamin D deficiency.  He has a past surgical history that includes Cholecystectomy and Carpal tunnel release.    Past Social and Family History: Mr. Harry reports that he quit smoking about 16 years ago. His smoking use included cigarettes. He has never used smokeless tobacco. He reports current alcohol use of about 1.0 - 2.0 standard drink of alcohol per week. He reports that he does not use drugs. His family history includes Diabetes in his father and mother; Heart disease in his mother; Hypertension in his brother, father, and mother; Stroke in his brother.    Review of Systems   Constitutional:  Positive for fatigue. Negative for activity change and fever.   Eyes:  Negative for visual disturbance.   Respiratory:  Negative for cough and shortness of breath.    Cardiovascular:  Positive for leg swelling. Negative for chest pain.   Gastrointestinal:  Negative for abdominal pain, constipation, diarrhea and nausea.   Genitourinary:  Negative for difficulty urinating, frequency and hematuria.   Musculoskeletal:  Negative for arthralgias and myalgias.   Skin:  Negative for wound.   Neurological:  Negative for weakness.   Psychiatric/Behavioral:  Negative for sleep disturbance.        Objective:   Blood pressure (!) 164/73, pulse 99, temperature 97 °F (36.1 °C), temperature source Tympanic, resp. rate 18, height 5' 6.22" (1.682 m), weight 89.1 kg (196 lb 6.9 oz), SpO2 98%.body mass index is 31.49 kg/m².    Physical Exam  Vitals and nursing note reviewed.   Constitutional:       Appearance: Normal appearance.   Cardiovascular:      Rate and Rhythm: Normal rate and regular rhythm.      Heart sounds: Normal heart sounds.   Pulmonary:      Effort: Pulmonary effort is normal.      Breath sounds: Normal breath sounds.   Abdominal:      General: There is no distension.   Musculoskeletal:         General: Normal range of motion.      Right lower leg: Edema present.      Left lower leg: Edema present.   Skin:     General: " Skin is warm and dry.   Neurological:      Mental Status: He is alert.         Labs:  Lab Results   Component Value Date    WBC 9.94 02/20/2025    HGB 7.4 (L) 02/20/2025    HCT 25.2 (L) 02/20/2025     02/20/2025    K 4.1 02/20/2025     (H) 02/20/2025    CO2 18 (L) 02/20/2025    BUN 58 (H) 02/20/2025    CREATININE 8.2 (H) 02/20/2025    EGFRNORACEVR 6.8 (A) 02/20/2025    GLUCOSE 223 (H) 12/15/2023    CALCIUM 9.1 02/20/2025    PHOS 6.8 (H) 02/20/2025    ALBUMIN 3.3 (L) 02/20/2025    AST 19 02/20/2025    ALT 13 02/20/2025    .6 (H) 02/20/2025     Labs were reviewed with the patient.    Assessment:     1. Pre-transplant evaluation for kidney transplant    2. CKD (chronic kidney disease), stage V    3. Diabetic nephropathy associated with type 2 diabetes mellitus    4. Primary hypertension    5. SLE (systemic lupus erythematosus related syndrome)    6. BMI 31.0-31.9,adult      Plan:   65 y.o. year old  male who has presented to be evaluated as a potential kidney transplant recipient.  He has advanced kidney disease secondary to diabetic nephropathy, biopsy proven.  Patient is currently pre-dialysis. Will need afternoon imaging, cardiac testing, and colonoscopy. Needs to establish with rheumatology due to history of SLE, currently off meds for several years.       Transplant Candidacy:   Based on available information, Mr. Harry is a suitable kidney transplant candidate.   Meets center eligibility for accepting HCV+ donor offer - Yes.  Patient educated on HCV+ donors. Qasim is willing to accept HCV+ donor offer - Yes   Patient is a candidate for KDPI > 85 kidney donor offer - No (weight > 88 kg).  Final determination of transplant candidacy will be made once workup is complete and reviewed by the selection committee.    Patient advised that it is recommended that all transplant candidates, and their close contacts and household members receive Covid vaccination.    UNOS Patient  Status  Functional Status: 80% - Normal activity with effort: some symptoms of disease    Francisca Bowers NP              [1]   Current Outpatient Medications   Medication Sig Dispense Refill    amLODIPine (NORVASC) 10 MG tablet Take 10 mg by mouth once daily.      atorvastatin (LIPITOR) 80 MG tablet Take 80 mg by mouth once daily.      calcitRIOL (ROCALTROL) 0.25 MCG Cap Take 0.25 mcg by mouth once daily.      carvediloL (COREG) 25 MG tablet Take 25 mg by mouth 2 (two) times daily.      dapagliflozin propanediol (FARXIGA) 10 mg tablet Take 10 mg by mouth once daily.      dulaglutide (TRULICITY) 3 mg/0.5 mL pen injector Inject 3 mg into the skin every 7 days.      ergocalciferol (VITAMIN D2) 50,000 unit Cap Take 50,000 Units by mouth every 7 days.      furosemide (LASIX) 40 MG tablet Take 40 mg by mouth once daily.      glimepiride (AMARYL) 1 MG tablet Take 1 mg by mouth before breakfast.      levothyroxine (SYNTHROID) 25 MCG tablet Take 25 mcg by mouth before breakfast.      omeprazole 20 mg TbEC Take 20 mg by mouth once daily.      sevelamer carbonate (RENVELA) 800 mg Tab Take 1,600 mg by mouth 3 (three) times daily with meals.      sodium bicarbonate 650 MG tablet Take 1,300 mg by mouth 2 (two) times daily.       No current facility-administered medications for this visit.

## 2025-02-20 NOTE — PROGRESS NOTES
INITIAL PATIENT EDUCATION NOTE AA    Mr. Qasim Harry was seen in pre-kidney transplant clinic for evaluation for kidney, kidney/pancreas or pancreas only transplant.  The patient attended an individual video education session that discussed/reviewed the following aspects of transplantation: evaluation including diagnostic and laboratory testing,(Chemistries, Hematology, Serologies including HIV and Hepatitis and HLA) required for transplantation and selection committee process, [employer] waitlist management/multiple listings, types of organs offered (KDPI < 85%, KDPI > 85%, PHS risk, DCD, HCV+, HIV+ for HIV+ recipients and enbloc/dual), financial aspects, surgical procedures, dietary instruction pre- and post-transplant, health maintenance pre- and post-transplant, post-transplant hospitalization and outpatient follow-up, potential to participate in a research protocol, and medication management and side effects.  A question and answer session was provided after the presentation.    The patient was seen by all members of the multi-disciplinary team to include: Nephrologist/DEBBIE, Surgeon, , Transplant Coordinator, , Pharmacist and Dietician (if applicable).    The patient reviewed and signed all consents for evaluation which were witnessed and sent to scanning into the [facility] chart.    The patient was given an education book and plan for further evaluation based on his individual assessment.      The Patient was educated on OPTN policy change regarding race based eGFR. For Black or  Americans, this eGFR could have shown that their kidneys were working better than they were.    Because of this change, we are looking at everyones record and assessing waiting time for people who are eligible. We will be reviewing your medical records and will notify you if you are eligible. We also encouraged patient to provide span 20 labs that are not in our electronic medical records    Reviewed  program requirement for complete COVID vaccination with documentation prior to listing.  COVID education information reviewed with patient. Patient encouraged to be up to date on all vaccinations.     The patient was informed that the transplant team would manage immediate post op pain. If the patient requires long term pain management, they will need to have that pain management addressed by their PCP or previous provider who wrote for long term pain medicines.    The patient was encouraged to call with any questions or concerns.

## 2025-02-20 NOTE — PROGRESS NOTES
PHARM.D. PRE-TRANSPLANT NOTE:    This patient's medication therapy was evaluated as part of his pre-transplant evaluation.      The following general pharmacologic concerns were noted: none    The following concerns for post-operative pain management were noted: none    The following pharmacologic concerns related to HCV therapy were noted: none      This patient's medication profile was reviewed for considerations for DAA Hepatitis C therapy:    [x]  No current inducers of CYP 3A4 or PGP  [x]  No amiodarone on this patient's EMR profile in the last 24 months  [x]  No past or current atrial fibrillation on this patient's EMR profile       Current Medications[1]        I am available for consultation and can be contacted, as needed by the other members of the Transplant team.          [1]   Current Outpatient Medications   Medication Sig Dispense Refill    amLODIPine (NORVASC) 10 MG tablet Take 10 mg by mouth once daily.      atorvastatin (LIPITOR) 80 MG tablet Take 80 mg by mouth once daily.      calcitRIOL (ROCALTROL) 0.25 MCG Cap Take 0.25 mcg by mouth once daily.      carvediloL (COREG) 25 MG tablet Take 25 mg by mouth 2 (two) times daily.      dapagliflozin propanediol (FARXIGA) 10 mg tablet Take 10 mg by mouth once daily.      dulaglutide (TRULICITY) 3 mg/0.5 mL pen injector Inject 3 mg into the skin every 7 days.      ergocalciferol (VITAMIN D2) 50,000 unit Cap Take 50,000 Units by mouth every 7 days.      furosemide (LASIX) 40 MG tablet Take 40 mg by mouth once daily.      glimepiride (AMARYL) 1 MG tablet Take 1 mg by mouth before breakfast.      levothyroxine (SYNTHROID) 25 MCG tablet Take 25 mcg by mouth before breakfast.      omeprazole 20 mg TbEC Take 20 mg by mouth once daily.      sevelamer carbonate (RENVELA) 800 mg Tab Take 1,600 mg by mouth 3 (three) times daily with meals.      sodium bicarbonate 650 MG tablet Take 1,300 mg by mouth 2 (two) times daily.       No current facility-administered  medications for this visit.

## 2025-02-21 NOTE — PROGRESS NOTES
TRANSPLANT NUTRITIONAL ASSESSMENT    Referring Provider: Kiet Anders     Reason for Visit: Pre-kidney transplant work-up (pre-dialysis)    Age: 65 y.o.  Sex: male    Problem List[1]  Past Medical History:   Diagnosis Date    Anemia, unspecified     CKD (chronic kidney disease)     Diabetes mellitus     Diabetes mellitus, type 2     Hyperparathyroidism, unspecified     Hypertension     Isolated non-nephrotic proteinuria     Macroscopic hematuria     Peripheral edema     Systemic lupus erythematosus, organ or system involvement unspecified     Vitamin D deficiency      Lab Results   Component Value Date    GLU 97 02/20/2025    K 4.1 02/20/2025    PHOS 6.8 (H) 02/20/2025    CHOL 118 (L) 02/20/2025    HDL 43 02/20/2025    TRIG 123 02/20/2025    ALBUMIN 3.3 (L) 02/20/2025    HGBA1C 6.0 (H) 02/20/2025    CALCIUM 9.1 02/20/2025     Other Pertinent Labs: N/A    Current Outpatient Medications   Medication Sig    amLODIPine (NORVASC) 10 MG tablet Take 10 mg by mouth once daily.    atorvastatin (LIPITOR) 80 MG tablet Take 80 mg by mouth once daily.    calcitRIOL (ROCALTROL) 0.25 MCG Cap Take 0.25 mcg by mouth once daily.    carvediloL (COREG) 25 MG tablet Take 25 mg by mouth 2 (two) times daily.    dapagliflozin propanediol (FARXIGA) 10 mg tablet Take 10 mg by mouth once daily.    dulaglutide (TRULICITY) 3 mg/0.5 mL pen injector Inject 3 mg into the skin every 7 days.    ergocalciferol (VITAMIN D2) 50,000 unit Cap Take 50,000 Units by mouth every 7 days.    furosemide (LASIX) 40 MG tablet Take 40 mg by mouth once daily.    glimepiride (AMARYL) 1 MG tablet Take 1 mg by mouth before breakfast.    levothyroxine (SYNTHROID) 25 MCG tablet Take 25 mcg by mouth before breakfast.    omeprazole 20 mg TbEC Take 20 mg by mouth once daily.    sevelamer carbonate (RENVELA) 800 mg Tab Take 1,600 mg by mouth 3 (three) times daily with meals.    sodium bicarbonate 650 MG tablet Take 1,300 mg by mouth 2 (two) times daily.     No current  "facility-administered medications for this visit.     Allergies: Ace inhibitors    Ht Readings from Last 1 Encounters:   02/20/25 5' 6.22" (1.682 m)     Wt Readings from Last 1 Encounters:   02/20/25 89.1 kg (196 lb 6.9 oz)      BMI: Body mass index is 31.49 kg/m².    Usual Weight: 180 lb   Weight Change/Time: stable x 1 year   Current Diet: regular   Appetite/Current Intake: good   Exercise/Physical Activity: functional in ADL   Nutritional/Herbal Supplements: none  Chewing/Swallowing Problems: none  Symptoms: chronic constipation     Estimated Kcal Need: 2228 kcal/day (25 kcal/kg)   Estimated Protein Need: 71-80 gm/day (0.8-0.9 gm/kg)    Nutritional History:   Pt and caregiver present. When speaking about label reading at the store, pt reports that he doesn't/can't read. When asked about caregiver support with grocery shopping, pt states he does not have any. Pt does not cook at home often.     Diet Recall    Morning: skips     Midday: cheese crackers     Evening: enjoys beans and rice, roast, baked chicken, vegetables 1-2x/wk (carrots, beets, greens)     Snacks: fruit 4x/wk (grapes, apple, oranges), hot sausage links, popcorn, raisins     Desserts: occasionally; candy     Beverages: 48-64 oz water/day, Dr. Pepper, bottled sweet tea       Seasonings: little salt, slap ya mama, herbs and spices     Restaurants/Fast Food: 2-3x/wk; typically burger dressed with no fries       Nutritional Diagnoses  Problem: food- and nutrition-related knowledge deficit  Etiology: RT lack of previous education on pre-kidney transplant nutrition recommendations  Symptoms: AEB diet recall and questions from pt     Educational Need? yes  Barriers: none identified  Discussed with: patient and caregiver  Interventions: Patient taught nutrition information regarding Pre-kidney transplant work-up (pre-dialysis). Renal Nutrition Therapy packet reviewed (high/low food sources of K, Phos and protein, low sodium and fluid intake, emphasis on " moderate protein intake). Encouraged physical activity daily, regular exercise as tolerated, stay mobile.   Goals/Recommendations: diet adherence and limit intake of high phosphorus foods  Actions Taken: instruct/provide written information  Patient and/or family comprehend instructions: yes  Outcome: Verbalizes understanding  Monitoring: Contact information provided, will f/u in clinic and communicate with the care team as needed.     Counseling Time: 20 minutes          [1]   Patient Active Problem List  Diagnosis    Diabetic nephropathy    SLE (systemic lupus erythematosus related syndrome)    Nephrotic range proteinuria    Anemia in chronic kidney disease (CKD)    Folic acid deficiency    Essential hypertension

## 2025-02-26 ENCOUNTER — RESULTS FOLLOW-UP (OUTPATIENT)
Dept: TRANSPLANT | Facility: HOSPITAL | Age: 65
End: 2025-02-26
Payer: MEDICARE

## 2025-02-26 NOTE — TELEPHONE ENCOUNTER
----- Message from Zoila Umanzor MD sent at 2/26/2025 11:03 AM CST -----  Agree. Prohibitive.  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  Monophasic waveforms on doppler   Prohibitive  sending to Dr. Umanzor   ----- Message -----  From: Interface, Rad Results In  Sent: 2/20/2025   1:51 PM CST  To: Artie Escobedo Jr., MD

## 2025-02-28 ENCOUNTER — COMMITTEE REVIEW (OUTPATIENT)
Dept: TRANSPLANT | Facility: CLINIC | Age: 65
End: 2025-02-28
Payer: MEDICARE

## 2025-02-28 NOTE — LETTER
February 28, 2025    Qasim Harry  [address] [place] [id number]    Dear Qasim Harry:  MRN: [id number]    It is the duty of the [facility] Selection Committee to determine which patients are candidates for a transplant. For this reason, our committee has the difficult task of evaluating patients to determine which ones have the greatest chance of having a successful transplant. We are aware of the magnitude of this responsibility, and we approach it with reverence and humility.    It is with regret I inform you that you are not approved as a transplant candidate due to severe iliac calcifications that anatomically excludes the ability for transplantation.This makes it impossible for surgeons to place a kidney in your abdomen. This means that your blood vessels used to supply blood to a transplanted kidney are hardened and have developed severe blockages that render them ineffective for use in transplant. At this time there is no treatment for reversal of this condition, and unfortunately this means that a kidney transplant is no longer an option. Based on this review, we have determined that at this time, you are not a candidate for a transplant at [facility].      The selection committee carefully considers each patient's transplant candidacy and determines whether it is safe to proceed with transplantation on a case-by-case basis using established selection criteria.  At present, the risk of proceeding with an elective transplant surgery has become too high.                                                                               Although the selection committee believes you are not a suitable transplant candidate, you have the option to be evaluated at other transplant centers who may have different selection criteria.  You may request your [facility] records be sent to any center of your choice by contacting our Medical Records Department at [phone].                                                                                Attached is a letter from the [employer] Organ [employer] (UNOS).  It describes the services and information offered to patients by UNOS and the Organ Procurement and Transplant Network.    The [facility] Selection Committee sincerely wishes you the best and remains available to answer any questions.  Please do not hesitate to contact our pre-transplant office if we can assist you in any other way.                                                                               Sincerely,      Christina Schmid MD  Medical Director, Kidney & Kidney/Pancreas Transplantation    Cc:  Kiet Anders MD    Encl: UNOS Letter               The Organ Procurement and Transplantation Network   Toll-free patient services line: [phone]  Your resource for organ transplant information      Staffed 8:30 am - 5:00 pm ET Monday - Friday   Leave a message 24/7 to receive a call back    The Organ Procurement and Transplantation Network (OPTN) is the national transplant system. It makes the policies that decide how donated organs are matched to patients waiting for a transplant. The OPTN:    Makes sure donated organs get matched to people on the transplant waiting list  Tells people about the donation and transplant processes  Makes sure that the public knows about the need for more organ and tissue donations    The OPTN has a free patient services line that you can call to:  Get more information about:   o Organ donation and organ transplants   o Donation and transplant policies  Get an information kit with:   o A list of transplant hospitals   o Waiting list information  Talk about any questions you may have about your transplant hospital or organ procurement organization. The staff will do their best to help you or point you to others who may help.  Find out how you can volunteer with the OPTN and help shape transplant policy    The patient services line number is:  3-337-579-7429    Patient services line staff CANNOT answer questions about your own medical care, including:  Waiting list status  Test results  Medical records  You will need to call your transplant hospital for this information.    The following websites have more information about transplantation and donation:  OPTN: https://optn.transplant.hrsa.gov/  For potential living donors and transplant recipients:   o Living with transplant: https://www.transplantliving.org/   o Living donation process: https://optn.transplant.hrsa.gov/living-donation/     o Financial assistance: https://www.livingdonorassistance.org/  Transplantation data: https://www.srtr.org/  Organ donation: https://www.organdonor.gov/    Volunteer with the OPTN: https://optn.transplant.hrsa.gov/get-involved

## 2025-02-28 NOTE — COMMITTEE REVIEW
Native Organ Dx: Diabetes Mellitus - Type II      Not approved for LRD/CAD transplant due to severe iliac calcifications that are prohibitive for kidney transplantation.     Phoned patient, informed of the above. Informed patient of the option to be evaluated at other transplant centers who may have different selection criteria. Informed patient denial letter will be sent to his home address and referring provider. Patient verbalized understanding of the above.      Note written by: Michelle Abadie,RN     ===============================================    I was present at the meeting and attest to the decision of the committee

## 2025-08-28 ENCOUNTER — TELEPHONE (OUTPATIENT)
Dept: TRANSPLANT | Facility: CLINIC | Age: 65
End: 2025-08-28
Payer: MEDICARE

## 2025-09-02 ENCOUNTER — TELEPHONE (OUTPATIENT)
Dept: TRANSPLANT | Facility: CLINIC | Age: 65
End: 2025-09-02
Payer: MEDICARE

## 2025-09-04 ENCOUNTER — TELEPHONE (OUTPATIENT)
Dept: TRANSPLANT | Facility: CLINIC | Age: 65
End: 2025-09-04
Payer: MEDICARE